# Patient Record
Sex: FEMALE | Race: WHITE | NOT HISPANIC OR LATINO | Employment: OTHER | URBAN - METROPOLITAN AREA
[De-identification: names, ages, dates, MRNs, and addresses within clinical notes are randomized per-mention and may not be internally consistent; named-entity substitution may affect disease eponyms.]

---

## 2021-07-28 PROBLEM — K21.9 GERD WITHOUT ESOPHAGITIS: Status: ACTIVE | Noted: 2021-07-28

## 2021-07-28 PROBLEM — F41.9 ANXIETY: Status: ACTIVE | Noted: 2019-06-07

## 2021-07-28 PROBLEM — Z80.0 FAMILY HX OF COLON CANCER: Status: ACTIVE | Noted: 2021-07-28

## 2021-08-09 ENCOUNTER — TELEPHONE (OUTPATIENT)
Dept: GASTROENTEROLOGY | Facility: HOSPITAL | Age: 61
End: 2021-08-09

## 2021-08-09 RX ORDER — SODIUM CHLORIDE 9 MG/ML
125 INJECTION, SOLUTION INTRAVENOUS CONTINUOUS
Status: CANCELLED | OUTPATIENT
Start: 2021-08-09

## 2021-08-10 ENCOUNTER — ANESTHESIA EVENT (OUTPATIENT)
Dept: GASTROENTEROLOGY | Facility: HOSPITAL | Age: 61
End: 2021-08-10

## 2021-08-10 ENCOUNTER — HOSPITAL ENCOUNTER (OUTPATIENT)
Dept: GASTROENTEROLOGY | Facility: HOSPITAL | Age: 61
Setting detail: OUTPATIENT SURGERY
Discharge: HOME | End: 2021-08-10
Attending: INTERNAL MEDICINE
Payer: COMMERCIAL

## 2021-08-10 ENCOUNTER — ANESTHESIA (OUTPATIENT)
Dept: GASTROENTEROLOGY | Facility: HOSPITAL | Age: 61
End: 2021-08-10

## 2021-08-10 VITALS
BODY MASS INDEX: 46.07 KG/M2 | OXYGEN SATURATION: 99 % | SYSTOLIC BLOOD PRESSURE: 114 MMHG | TEMPERATURE: 98.5 F | HEIGHT: 63 IN | DIASTOLIC BLOOD PRESSURE: 57 MMHG | RESPIRATION RATE: 21 BRPM | HEART RATE: 68 BPM | WEIGHT: 260 LBS

## 2021-08-10 DIAGNOSIS — K21.9 GERD WITHOUT ESOPHAGITIS: ICD-10-CM

## 2021-08-10 PROBLEM — G47.33 OSA ON CPAP: Status: ACTIVE | Noted: 2021-08-10

## 2021-08-10 PROBLEM — Z99.89 OSA ON CPAP: Status: ACTIVE | Noted: 2021-08-10

## 2021-08-10 PROBLEM — Z98.890 PONV (POSTOPERATIVE NAUSEA AND VOMITING): Status: ACTIVE | Noted: 2021-08-10

## 2021-08-10 PROBLEM — R11.2 PONV (POSTOPERATIVE NAUSEA AND VOMITING): Status: ACTIVE | Noted: 2021-08-10

## 2021-08-10 LAB — GLUCOSE SERPL-MCNC: 123 MG/DL (ref 65–140)

## 2021-08-10 PROCEDURE — 88305 TISSUE EXAM BY PATHOLOGIST: CPT | Performed by: PATHOLOGY

## 2021-08-10 PROCEDURE — 82948 REAGENT STRIP/BLOOD GLUCOSE: CPT

## 2021-08-10 PROCEDURE — 43239 EGD BIOPSY SINGLE/MULTIPLE: CPT | Performed by: INTERNAL MEDICINE

## 2021-08-10 RX ORDER — PROPOFOL 10 MG/ML
INJECTION, EMULSION INTRAVENOUS AS NEEDED
Status: DISCONTINUED | OUTPATIENT
Start: 2021-08-10 | End: 2021-08-10

## 2021-08-10 RX ORDER — OMEPRAZOLE 40 MG/1
40 CAPSULE, DELAYED RELEASE ORAL DAILY
Qty: 30 CAPSULE | Refills: 8 | Status: SHIPPED | OUTPATIENT
Start: 2021-08-10 | End: 2021-12-03

## 2021-08-10 RX ORDER — MULTIVIT WITH MINERALS/LUTEIN
500 TABLET ORAL 2 TIMES DAILY
COMMUNITY

## 2021-08-10 RX ORDER — SODIUM CHLORIDE 9 MG/ML
125 INJECTION, SOLUTION INTRAVENOUS CONTINUOUS
Status: DISCONTINUED | OUTPATIENT
Start: 2021-08-10 | End: 2021-08-14 | Stop reason: HOSPADM

## 2021-08-10 RX ORDER — LIDOCAINE HYDROCHLORIDE 20 MG/ML
INJECTION, SOLUTION EPIDURAL; INFILTRATION; INTRACAUDAL; PERINEURAL AS NEEDED
Status: DISCONTINUED | OUTPATIENT
Start: 2021-08-10 | End: 2021-08-10

## 2021-08-10 RX ADMIN — PROPOFOL 30 MG: 10 INJECTION, EMULSION INTRAVENOUS at 14:30

## 2021-08-10 RX ADMIN — SODIUM CHLORIDE 125 ML/HR: 0.9 INJECTION, SOLUTION INTRAVENOUS at 14:18

## 2021-08-10 RX ADMIN — PROPOFOL 150 MG: 10 INJECTION, EMULSION INTRAVENOUS at 14:27

## 2021-08-10 RX ADMIN — LIDOCAINE HYDROCHLORIDE 100 MG: 20 INJECTION, SOLUTION EPIDURAL; INFILTRATION; INTRACAUDAL; PERINEURAL at 14:28

## 2021-08-10 NOTE — ANESTHESIA PREPROCEDURE EVALUATION
Procedure:  EGD    Relevant Problems   ANESTHESIA   (+) PONV (postoperative nausea and vomiting)      CARDIO   (+) Hypertension, benign      GI/HEPATIC   (+) GERD without esophagitis      NEURO/PSYCH   (+) Anxiety      PULMONARY   (+) SIXTO on CPAP      Other   (+) Morbid obesity (HCC)        Physical Exam    Airway    Mallampati score: III  TM Distance: >3 FB  Neck ROM: full     Dental   No notable dental hx     Cardiovascular  Rhythm: regular, Rate: normal, Cardiovascular exam normal    Pulmonary  Pulmonary exam normal Breath sounds clear to auscultation,     Other Findings        Anesthesia Plan  ASA Score- 3     Anesthesia Type- general and IV sedation with anesthesia with ASA Monitors  Additional Monitors:   Airway Plan:     Comment: Nasal CPAP  Ondansetron IV pre-procedure  Plan Factors-    Chart reviewed  Patient summary reviewed  Patient is not a current smoker  Patient instructed to abstain from smoking on day of procedure  Patient did not smoke on day of surgery  Induction- intravenous  Postoperative Plan-     Informed Consent- Anesthetic plan and risks discussed with patient  I personally reviewed this patient with the CRNA  Discussed and agreed on the Anesthesia Plan with the CRNA  Rock Kaplan

## 2021-08-10 NOTE — DISCHARGE INSTRUCTIONS
Upper Endoscopy   WHAT YOU NEED TO KNOW:   An upper endoscopy is also called an upper gastrointestinal (GI) endoscopy, or an esophagogastroduodenoscopy (EGD)  You may feel bloated, gassy, or have some abdominal discomfort after your procedure  Your throat may be sore for 24 to 36 hours  You may burp or pass gas from air that is still inside your body  DISCHARGE INSTRUCTIONS:   Call 911 if:   · You have sudden chest pain or trouble breathing  Seek care immediately if:   · You feel dizzy or faint  · You have trouble swallowing  · You have severe throat pain  · Your bowel movements are very dark or black  · Your abdomen is hard and firm and you have severe pain  · You vomit blood  Contact your healthcare provider if:   · You feel full or bloated and cannot burp or pass gas  · You have not had a bowel movement for 3 days after your procedure  · You have neck pain  · You have a fever or chills  · You have nausea or are vomiting  · You have a rash or hives  · You have questions or concerns about your endoscopy  Relieve a sore throat:  Suck on throat lozenges or crushed ice  Gargle with a small amount of warm salt water  Mix 1 teaspoon of salt and 1 cup of warm water to make salt water  Relieve gas and discomfort from bloating:  Lie on your right side with a heating pad on your abdomen  Take short walks to help pass gas  Eat small meals until bloating is relieved  Rest after your procedure:  Do not drive or make important decisions until the day after your procedure  Return to your normal activity as directed  You can usually return to work the day after your procedure  Follow up with your healthcare provider as directed:  Write down your questions so you remember to ask them during your visits  © Copyright [employer] 2021 Information is for End User's use only and may not be sold, redistributed or otherwise used for commercial purposes   All illustrations and images included in CareNotes® are the copyrighted property of A D A M , Inc  or Torie Mendez   The above information is an  only  It is not intended as medical advice for individual conditions or treatments  Talk to your doctor, nurse or pharmacist before following any medical regimen to see if it is safe and effective for you

## 2021-10-20 ENCOUNTER — TELEPHONE (OUTPATIENT)
Dept: OBGYN CLINIC | Facility: OTHER | Age: 61
End: 2021-10-20

## 2021-11-17 ENCOUNTER — APPOINTMENT (OUTPATIENT)
Dept: RADIOLOGY | Facility: CLINIC | Age: 61
End: 2021-11-17
Payer: COMMERCIAL

## 2021-11-17 ENCOUNTER — OFFICE VISIT (OUTPATIENT)
Dept: OBGYN CLINIC | Facility: CLINIC | Age: 61
End: 2021-11-17
Payer: COMMERCIAL

## 2021-11-17 VITALS
BODY MASS INDEX: 48.16 KG/M2 | SYSTOLIC BLOOD PRESSURE: 135 MMHG | HEART RATE: 69 BPM | WEIGHT: 271.8 LBS | DIASTOLIC BLOOD PRESSURE: 85 MMHG | HEIGHT: 63 IN

## 2021-11-17 DIAGNOSIS — M48.061 FORAMINAL STENOSIS OF LUMBAR REGION: ICD-10-CM

## 2021-11-17 DIAGNOSIS — M54.40 CHRONIC MIDLINE LOW BACK PAIN WITH SCIATICA, SCIATICA LATERALITY UNSPECIFIED: ICD-10-CM

## 2021-11-17 DIAGNOSIS — M48.062 SPINAL STENOSIS OF LUMBAR REGION WITH NEUROGENIC CLAUDICATION: ICD-10-CM

## 2021-11-17 DIAGNOSIS — M51.37 DISC DISEASE, DEGENERATIVE, LUMBAR OR LUMBOSACRAL: ICD-10-CM

## 2021-11-17 DIAGNOSIS — M54.42 LOW BACK PAIN WITH BILATERAL SCIATICA, UNSPECIFIED BACK PAIN LATERALITY, UNSPECIFIED CHRONICITY: ICD-10-CM

## 2021-11-17 DIAGNOSIS — M43.10 DEGENERATIVE SPONDYLOLISTHESIS: ICD-10-CM

## 2021-11-17 DIAGNOSIS — M54.2 NECK PAIN: ICD-10-CM

## 2021-11-17 DIAGNOSIS — M50.30 DEGENERATIVE DISC DISEASE, CERVICAL: ICD-10-CM

## 2021-11-17 DIAGNOSIS — M51.36 DDD (DEGENERATIVE DISC DISEASE), LUMBAR: Primary | ICD-10-CM

## 2021-11-17 DIAGNOSIS — M54.41 LOW BACK PAIN WITH BILATERAL SCIATICA, UNSPECIFIED BACK PAIN LATERALITY, UNSPECIFIED CHRONICITY: ICD-10-CM

## 2021-11-17 DIAGNOSIS — G89.29 CHRONIC MIDLINE LOW BACK PAIN WITH SCIATICA, SCIATICA LATERALITY UNSPECIFIED: ICD-10-CM

## 2021-11-17 PROCEDURE — 72110 X-RAY EXAM L-2 SPINE 4/>VWS: CPT

## 2021-11-17 PROCEDURE — 99204 OFFICE O/P NEW MOD 45 MIN: CPT | Performed by: ORTHOPAEDIC SURGERY

## 2022-08-03 PROBLEM — M54.9 BACK PAIN: Status: ACTIVE | Noted: 2022-08-03

## 2022-08-03 PROBLEM — K59.01 SLOW TRANSIT CONSTIPATION: Status: ACTIVE | Noted: 2022-08-03

## 2023-06-06 ENCOUNTER — OFFICE VISIT (OUTPATIENT)
Dept: BARIATRICS | Facility: CLINIC | Age: 63
End: 2023-06-06
Payer: COMMERCIAL

## 2023-06-06 VITALS
SYSTOLIC BLOOD PRESSURE: 132 MMHG | HEIGHT: 63 IN | RESPIRATION RATE: 20 BRPM | BODY MASS INDEX: 46.92 KG/M2 | TEMPERATURE: 98.2 F | DIASTOLIC BLOOD PRESSURE: 80 MMHG | HEART RATE: 76 BPM | OXYGEN SATURATION: 94 % | WEIGHT: 264.8 LBS

## 2023-06-06 DIAGNOSIS — G47.33 OSA ON CPAP: ICD-10-CM

## 2023-06-06 DIAGNOSIS — F41.9 ANXIETY: ICD-10-CM

## 2023-06-06 DIAGNOSIS — E11.9 TYPE 2 DIABETES MELLITUS WITHOUT COMPLICATION (HCC): ICD-10-CM

## 2023-06-06 DIAGNOSIS — Z99.89 OSA ON CPAP: ICD-10-CM

## 2023-06-06 DIAGNOSIS — E11.69 DIABETES MELLITUS TYPE 2 IN OBESE (HCC): ICD-10-CM

## 2023-06-06 DIAGNOSIS — I10 HYPERTENSION, BENIGN: ICD-10-CM

## 2023-06-06 DIAGNOSIS — E66.9 DIABETES MELLITUS TYPE 2 IN OBESE (HCC): ICD-10-CM

## 2023-06-06 DIAGNOSIS — K21.9 GERD WITHOUT ESOPHAGITIS: ICD-10-CM

## 2023-06-06 DIAGNOSIS — E66.01 MORBID OBESITY (HCC): Primary | ICD-10-CM

## 2023-06-06 PROCEDURE — 99204 OFFICE O/P NEW MOD 45 MIN: CPT | Performed by: PHYSICIAN ASSISTANT

## 2023-06-06 RX ORDER — METFORMIN HYDROCHLORIDE 750 MG/1
TABLET, EXTENDED RELEASE ORAL
COMMUNITY
Start: 2023-06-02

## 2023-06-06 RX ORDER — HYDROCODONE BITARTRATE AND ACETAMINOPHEN 5; 325 MG/1; MG/1
TABLET ORAL
COMMUNITY
Start: 2023-05-04

## 2023-06-06 NOTE — ASSESSMENT & PLAN NOTE
Lab Results   Component Value Date    HGBA1C 6 3 (H) 04/04/2023   -Currently on Metformin 750mg daily  -Will plan to start Mounjaro 2 5mg to improve glycemia and help with weight loss  Denies personal hx of pancreatitis or family hx of MEN2 or MTC   SE profile reviewed

## 2023-06-06 NOTE — PROGRESS NOTES
Assessment/Plan: Morbid obesity ([facility] )  -Discussed options of HealthyCORE-Intensive Lifestyle Intervention Program, Very Low Calorie Diet-VLCD, Conservative Program, Dell-En-Y Gastric Bypass and Vertical Sleeve Gastrectomy and the role of weight loss medications  -not currently interested in surgery  Discussed metabolic benefits of bariatric surgery  -Initial weight loss goal of 5-10% weight loss for improved health  -Screening labs: reviewed CMP, Lipid panel, TSH, HgbA1c  -Patient is interested in pursuing conservative program  -Calorie goals, sample menu, portion size guidelines, and food logging reviewed with the patient  SIXTO on CPAP  -compliant with CPAP  -can improve with weight loss    Type 2 diabetes mellitus without complication (HCC)    Lab Results   Component Value Date    HGBA1C 6 3 (H) 04/04/2023   -Currently on Metformin 750mg daily  -Will plan to start Mounjaro 2 5mg to improve glycemia and help with weight loss  Denies personal hx of pancreatitis or family hx of MEN2 or MTC  SE profile reviewed    GERD without esophagitis  -On Omeprazole  -avoid food triggers and large portion sizes  -can improve with weight loss    Hypertension, benign  -On Bisoprolol-HCTZ + additional HCTZ  -can improve with weight loss    Anxiety  -On Cymbalta  -Also takes for Fibromyalagia    Goals:    Food log (ie ) www myfitnesspal com,sparkpeople  com,loseit com,calorieking  com,etc    No sugary beverages  At least 64oz of water daily  Increase physical activity by 10 minutes daily  Gradually increase physical activity to a goal of 5 days per week for 30 minutes of MODERATE intensity PLUS 2 days per week of FULL BODY resistance training  5810-1265 calories per day  70 grams of protein per day      Follow up in approximately 2 months with Non-Surgical Physician/Advanced Practitioner      Diagnoses and all orders for this visit:    Morbid obesity ([facility] )    SIXTO on CPAP    Diabetes mellitus type 2 in obese ([facility] )  - tirzepatide 2 5 MG/0 5ML; Inject 0 5 mL (2 5 mg total) under the skin every 7 days    GERD without esophagitis    Hypertension, benign    Anxiety    Type 2 diabetes mellitus without complication (HCC)    Other orders  -     HYDROcodone-acetaminophen (NORCO) 5-325 mg per tablet; TAKE 1-2 TABLETS BY MOUTH EVERY 12 (TWELVE) HOURS AS NEEDED FOR PAIN  MAX DAILY AMOUNT: 4 TABLETS  -     metFORMIN (GLUCOPHAGE-XR) 750 mg 24 hr tablet  -     DULoxetine HCl 60 MG CSDR          Subjective:   Chief Complaint   Patient presents with   • Consult       Patient ID: Ash Wilson  is a 58 y o  female with excess weight/obesity here to pursue weight managment  Past Medical History:   Diagnosis Date   • Anusitis     history of   • CAD (coronary artery disease) 2008   • Diverticulosis 2006   • Fatty liver    • Fibromyalgia    • Fibromyalgia, primary    • GERD (gastroesophageal reflux disease)    • H  pylori infection 12/1999    treated with Prilosec and Biaxin,  Recurrent H  pylori stool antigen positive 06/03 treated with Aciphex, amoxicillin and Biaxin unsuccessfully with recurrent positivity 12/03, treated Prilosec and Biaxin 01/04  Positive breath test , 05/04 with Aciphex therapy used for 2 weeks 08/04     • History of DVT (deep vein thrombosis)     following knee replacement   • History of panic attacks    • Hyperlipidemia    • Hypertension          HPI:  Obesity/Excess Weight:  Severity: Severe  Onset:  Since childhood , worsened after pregnancies  Modifiers: [employer] - lost 100 lbs, Physician supervised weight loss program, Weight [employer]chers, [place] Weight loss  Contributing factors: Poor Food Choices and Pregnancy  Associated symptoms: feels uncomfortable, poor self image, comorbid conditions, affects mobility    Goals: 140 lbs  Highest:  273 lbs    Hydration: 10-12 glasses water, hot tea unsweetened, DD coffee + cream + sugar, rare regular soda  ETOH: denies  Exercise: tries to walk some; no routinue  Occupation: "disabled  Sleep: 7 hours  Smoking: denies, former      Colonoscopy: completed 2020, 5 year recall    The following portions of the patient's history were reviewed and updated as appropriate: allergies, current medications, past family history, past medical history, past social history, past surgical history and problem list     Review of Systems   Constitutional: Negative for chills and fever  HENT: Negative for sore throat  Respiratory: Positive for cough (dry cough related to allergies)  Negative for shortness of breath  Cardiovascular: Positive for chest pain (intermittently, relieves if burping, reports negative cardiac work up)  Negative for palpitations  Gastrointestinal: Positive for constipation (chronic) and nausea  Negative for abdominal pain and vomiting  +GERD   Genitourinary: Negative for dysuria  Musculoskeletal: Positive for arthralgias  Skin: Negative for rash  Neurological: Negative for dizziness and headaches  Psychiatric/Behavioral: Positive for dysphoric mood (occasionally)  The patient is nervous/anxious  Feels discouraged       Objective:    /80 (BP Location: Left arm, Patient Position: Sitting, Cuff Size: Large)   Pulse 76   Temp 98 2 °F (36 8 °C)   Resp 20   Ht 5' 2 5\" (1 588 m)   Wt 120 kg (264 lb 12 8 oz)   SpO2 94%   BMI 47 66 kg/m²     Physical Exam  Vitals and nursing note reviewed  Constitutional   General appearance: Abnormal   well developed and morbidly obese  Eyes No conjunctival pallor  Ears, Nose, Mouth, and Throat Oral mucosa moist    Pulmonary   Respiratory effort: No increased work of breathing or signs of respiratory distress  Auscultation of lungs: Clear to auscultation, equal breath sounds bilaterally, no wheezes, no rales, no rhonci  Cardiovascular   Auscultation of heart: Normal rate and rhythm, normal S1 and S2, without murmurs      Examination of extremities for edema and/or varicosities: + edema bilateral LE " Abdomen   Abdomen: Abnormal   The abdomen was obese  Bowel sounds were normal  The abdomen was soft and nontender     Musculoskeletal   Gait and station: Normal     Psychiatric   Orientation to person, place and time: Normal     Affect: appropriate

## 2023-06-06 NOTE — ASSESSMENT & PLAN NOTE
-Discussed options of HealthyCORE-Intensive Lifestyle Intervention Program, Very Low Calorie Diet-VLCD, Conservative Program, Dell-En-Y Gastric Bypass and Vertical Sleeve Gastrectomy and the role of weight loss medications  -not currently interested in surgery  Discussed metabolic benefits of bariatric surgery  -Initial weight loss goal of 5-10% weight loss for improved health  -Screening labs: reviewed CMP, Lipid panel, TSH, HgbA1c  -Patient is interested in pursuing conservative program  -Calorie goals, sample menu, portion size guidelines, and food logging reviewed with the patient

## 2023-06-06 NOTE — PATIENT INSTRUCTIONS
Goals: Food log (ie ) www myfitnesspal com,sparkpeople  com,loseit com,calorieking  com,etc    No sugary beverages  At least 64oz of water daily  Increase physical activity by 10 minutes daily  Gradually increase physical activity to a goal of 5 days per week for 30 minutes of MODERATE intensity PLUS 2 days per week of FULL BODY resistance training  2089-3037 calories per day  70 grams of protein per day    Www  Mounjaro  com

## 2023-06-09 ENCOUNTER — TELEPHONE (OUTPATIENT)
Dept: BARIATRICS | Facility: CLINIC | Age: 63
End: 2023-06-09

## 2023-06-09 NOTE — TELEPHONE ENCOUNTER
Pt called regarding tirzepatide  It is not covered  Pt was told by the pharmacy that they sent something to our office  I gave our fax number to patient and asked her to verify that they have the correct number  Pt is on vacation next week and was hoping to have this resolved as soon as possible

## 2023-06-12 NOTE — TELEPHONE ENCOUNTER
Prior auth started and marked urgent    FREDIS LUCERO (Lea: [id number])  Mounjaro 2 5MG/0 5ML pen-injectors

## 2023-06-13 DIAGNOSIS — E66.9 DIABETES MELLITUS TYPE 2 IN OBESE (HCC): Primary | ICD-10-CM

## 2023-06-13 DIAGNOSIS — E11.69 DIABETES MELLITUS TYPE 2 IN OBESE (HCC): Primary | ICD-10-CM

## 2023-06-13 NOTE — TELEPHONE ENCOUNTER
Denied    Called and spoke to patient to let her know that provider will be sending over [address] instead    Aware provider will send mychart message with instructions

## 2023-08-22 ENCOUNTER — OFFICE VISIT (OUTPATIENT)
Dept: BARIATRICS | Facility: CLINIC | Age: 63
End: 2023-08-22
Payer: COMMERCIAL

## 2023-08-22 VITALS
HEART RATE: 68 BPM | DIASTOLIC BLOOD PRESSURE: 78 MMHG | WEIGHT: 258.6 LBS | TEMPERATURE: 98 F | RESPIRATION RATE: 20 BRPM | OXYGEN SATURATION: 94 % | BODY MASS INDEX: 45.82 KG/M2 | SYSTOLIC BLOOD PRESSURE: 128 MMHG | HEIGHT: 63 IN

## 2023-08-22 DIAGNOSIS — E66.9 DIABETES MELLITUS TYPE 2 IN OBESE (HCC): ICD-10-CM

## 2023-08-22 DIAGNOSIS — E11.69 DIABETES MELLITUS TYPE 2 IN OBESE (HCC): ICD-10-CM

## 2023-08-22 DIAGNOSIS — E66.01 MORBID OBESITY (HCC): Primary | ICD-10-CM

## 2023-08-22 DIAGNOSIS — E11.9 TYPE 2 DIABETES MELLITUS WITHOUT COMPLICATION (HCC): ICD-10-CM

## 2023-08-22 PROCEDURE — 99214 OFFICE O/P EST MOD 30 MIN: CPT | Performed by: PHYSICIAN ASSISTANT

## 2023-08-22 RX ORDER — TERBINAFINE HYDROCHLORIDE 250 MG/1
TABLET ORAL
COMMUNITY
Start: 2023-07-17

## 2023-08-22 RX ORDER — FUROSEMIDE 20 MG/1
TABLET ORAL
COMMUNITY
Start: 2023-08-18

## 2023-08-22 NOTE — ASSESSMENT & PLAN NOTE
-Patient is pursuing Conservative Program  -Initial weight loss goal of 5-10% weight loss for improved health  -not currently interested in bariatric surgery  -Screening labs: up to date  -dietary recall reviewed. Suggestions provided.  Encouraged patient to measure portions  -Patient open to meeting with [facility] for Telemed visit  -suggested meal planning with RD but she defers currently  -recommend to resume Ozempic with plans to transition to [facility]VICKY SANTOS    Initial: 264.8 lbs  Current: 258.6 lbs  Change: -6.2 lbs  Goal: 140 lbs

## 2023-08-22 NOTE — ASSESSMENT & PLAN NOTE
Lab Results   Component Value Date    HGBA1C 6.3 (H) 04/04/2023   -Currently on Metformin 750mg daily  -Mounjaro denied as she must try and fail 2 formulary alternatives. Was on Ozempic but her pen is malfunctioning and was only able to take 2 doses of the 0.5mg dose. Has been off the med for 3 weeks. Has not been able to get in touch with . Informed patient I would reach out to rep.  In the mean time a  Refill was sent to pharmacy and she will start again at 0.25mg dose

## 2023-08-22 NOTE — PROGRESS NOTES
Assessment/Plan: Morbid obesity ([address])  -Patient is pursuing Conservative Program  -Initial weight loss goal of 5-10% weight loss for improved health  -not currently interested in bariatric surgery  -Screening labs: up to date  -dietary recall reviewed. Suggestions provided. Encouraged patient to measure portions  -Patient open to meeting with [address] [place] [address] for Telemed visit  -suggested meal planning with RD but she defers currently  -recommend to resume Ozempic with plans to transition to [place]    Initial: 264.8 lbs  Current: 258.6 lbs  Change: -6.2 lbs  Goal: 140 lbs    Type 2 diabetes mellitus without complication ([address] [facility])    Lab Results   Component Value Date    HGBA1C 6.3 (H) 04/04/2023   -Currently on Metformin 750mg daily  -Mounjaro denied as she must try and fail 2 formulary alternatives. Was on Ozempic but her pen is malfunctioning and was only able to take 2 doses of the 0.5mg dose. Has been off the med for 3 weeks. Has not been able to get in touch with . Informed patient I would reach out to rep. In the mean time a  Refill was sent to pharmacy and she will start again at 0.25mg dose    Goals:    Food log (ie.) www.[employer].com,[employer]. [employer].com,[employer]. com,etc.   No sugary beverages. At least 64oz of water daily. Increase physical activity by 10 minutes daily. Gradually increase physical activity to a goal of 5 days per week for 30 minutes of MODERATE intensity PLUS 2 days per week of FULL BODY resistance training  3703-2508 calories per day  70 grams of protein per day  5-10 servings of fruits and vegetables per day   4oz lean protein, 1/2 cup starch, 1-2 cups of nonstarchy veggie      Follow up in approximately 2 months with Non-Surgical Physician/Advanced Practitioner. Diagnoses and all orders for this visit:    Morbid obesity ([address])  -     semaglutide, 0.25 or 0.5 mg/dose, (Ozempic, 0.25 or 0.5 MG/DOSE,) 2 mg/3 mL injection pen;  Inject 0.25mg subcutaneously (abdomen, thigh) once weekly for 4 weeks and then increase to 0.5mg once weekly    Type 2 diabetes mellitus without complication (HCC)    Diabetes mellitus type 2 in obese (HCC)  -     semaglutide, 0.25 or 0.5 mg/dose, (Ozempic, 0.25 or 0.5 MG/DOSE,) 2 mg/3 mL injection pen; Inject 0.25mg subcutaneously (abdomen, thigh) once weekly for 4 weeks and then increase to 0.5mg once weekly    Other orders  -     terbinafine (LamISIL) 250 mg tablet; take 1 tablet by mouth once daily for 10 days  -     furosemide (LASIX) 20 mg tablet; take 1 tablet by mouth every morning for 6 days then CALL OFFICE WITH UPDATE (HOLD ONTO EXTRA TABS)          Subjective:   Chief Complaint   Patient presents with   • Follow-up        Patient ID: Debbi Salinas  is a 58 y.o. female with excess weight/obesity here to pursue weight managment. Patient is pursuing Conservative Program.     HPI Patient presents for MWM follow up. Patient was taking Ozempic 0.25mg and increased to 0.5mg and then her pen malfunctioned. Has been off of medication for past 3 weeks. Was able to take 2 doses of the 0.25mg dose. -Reports her fasting blood sugar has been elevated since off the Ozempic. Continues on Metformin    Food logging: not reguarly, hard to keep up with it. Not measuring portions  Exercise: reports feels she has been more active but no routine.  Was in PT for her legs, difficult for her to exercise  Hydration: meeting water goals, cut out DD coffee  Food cravings: struggles with sweet and salty cravings    B: banana oatmeal breakfast bar w/ [place] yogurt + blueberries OR Smoothie with frozen blueberries + collagen protein powder and almond milk + heaping spoonful or greek yogurt + nut butter  S: greek yogurt OR Nuts mixed with dark chocolate OR veggie straws or chips  L: grilled chicken on white bun with tomato + mozarella cheese w/ clark and honey mustard OR chicken salad  S:  Potato chips + onion dip or chex mix  D: Taco with white tortilla w./ ground beef + sour cream and salsa and veggie  S: veggie straws or small ice cream cone    -reports gluten sensitive    Wt Readings from Last 10 Encounters:   08/22/23 117 kg (258 lb 9.6 oz)   06/06/23 120 kg (264 lb 12.8 oz)   01/09/23 112 kg (247 lb)   08/03/22 118 kg (260 lb 9.6 oz)   11/17/21 123 kg (271 lb 12.8 oz)   08/10/21 118 kg (260 lb)   07/28/21 122 kg (268 lb 9.6 oz)        The following portions of the patient's history were reviewed and updated as appropriate: allergies, current medications, past family history, past medical history, past social history, past surgical history and problem list.    Review of Systems   Cardiovascular: Negative. Gastrointestinal: Negative for constipation and diarrhea. Objective:    /78 (BP Location: Left arm, Patient Position: Sitting, Cuff Size: Large)   Pulse 68   Temp 98 °F (36.7 °C)   Resp 20   Ht 5' 2.5" (1.588 m)   Wt 117 kg (258 lb 9.6 oz)   SpO2 94%   BMI 46.54 kg/m²      Physical Exam  Vitals and nursing note reviewed. Constitutional:       General: She is not in acute distress. Appearance: She is obese. She is not toxic-appearing. HENT:      Head: Normocephalic and atraumatic. Mouth/Throat:      Mouth: Mucous membranes are moist.   Eyes:      General: No scleral icterus. Pulmonary:      Effort: Pulmonary effort is normal. No respiratory distress. Abdominal:      Comments: Obese, protuberant   Musculoskeletal:      Right lower leg: Edema present. Left lower leg: Edema present. Neurological:      General: No focal deficit present. Mental Status: She is alert and oriented to person, place, and time. Psychiatric:         Mood and Affect: Mood normal.         Behavior: Behavior normal.         Thought Content:  Thought content normal.

## 2023-08-22 NOTE — PATIENT INSTRUCTIONS
Goals: Food log (ie.) www.[employer].com,[employer]. [employer].com,[employer]. com,etc.   No sugary beverages. At least 64oz of water daily. Increase physical activity by 10 minutes daily.  Gradually increase physical activity to a goal of 5 days per week for 30 minutes of MODERATE intensity PLUS 2 days per week of FULL BODY resistance training  2448-1630 calories per day  70 grams of protein per day  5-10 servings of fruits and vegetables per day   4oz lean protein, 1/2 cup starch, 1-2 cups of nonstarchy veggie

## 2023-08-28 ENCOUNTER — OFFICE VISIT (OUTPATIENT)
Dept: BARIATRICS | Facility: CLINIC | Age: 63
End: 2023-08-28

## 2023-08-28 DIAGNOSIS — E66.01 MORBID OBESITY (HCC): Primary | ICD-10-CM

## 2023-08-28 PROCEDURE — RECHECK

## 2023-08-28 NOTE — PROGRESS NOTES
Patient's name and  were verified during visit. Provider explained that [employer] is a HIPPA compliant platform and to further protect their confidentiality the provider was alone and the office door was closed. Patient consented to the virtual video visit. This visit is free. Patient presents for 1 hour Behavioral Health Evaluation as a part of Medical Weight Management Program.    Eating behaviors/food choices: patient struggles with cravings, distorted relationship with food and poor self image from when she was younger. She had received negative messages about her body as a child, taught not to waste food and was given very highly palatable food with confusing messaging about what she should eat. Discussed her routine, she is trying to balance taking care of her dietary needs with helping others. She may forget to bring healthy meals with her when going to her daughter's house when she helps with her grandchildren or if her daughter makes dinner for them it may be higher calorie items. Encouraged patient to work on planning out her meals with her family, possibly bringing her own groceries to her daughter's house since she's splitting her time between both homes so she can care for herself. She is trying to get better with tracking but sometimes forgets and is tracking after she's already eaten calories. Suggested she consider pre-tracking her meals so she can see her remaining calorie budget for the day. Suggested she work on getting in lean proteins and healthy snacks to sustain her between meals when needed. 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 She helps her daughter take care of her new born and 3year old, her daughter struggles with her own anxiety and depression.  Patient feels she needs to be there to help her along with wanting to help her daughter but it is at the sacrifice of her own health at times. Suggested bringing in more support for her daughter in the form of therapy if her daughter is struggling as she is so it doesn't fall to her to take care of everything. Encouraged her to look ahead at her week on a Sunday and to plan out meals, shopping for her home and her daughter's if needed to make sure she has the healthy items for her meals. Discussed the pillars of self care, good nutrition, activity, rest and being aware of mindset and to make sure she's tuning into whether she's attending to these areas regularly. Goals:    - tune into triggers of cravings and mindset regarding food, check on stress level, fatigue or feelings that could be prompting desire to eat  - reframe mindset about food, there is room for all food, figure out where higher calorie foods can fit into your caloric budget and if there are certain foods that are hard to resist having larger portions. - consider planning and pre-tracking foods to figure out calories left for other meals and snacks  - incorporate activity throughout the day, be aware of appetite increase due to being more active  - talk with supports about how they can help with your weight management journey, find balance in caring for self as well as others.     Next Appointment:  With PA on 11/7

## 2023-08-28 NOTE — PATIENT INSTRUCTIONS
- tune into triggers of cravings and mindset regarding food, check on stress level, fatigue or feelings that could be prompting desire to eat  - reframe mindset about food, there is room for all food, figure out where higher calorie foods can fit into your caloric budget and if there are certain foods that are hard to resist having larger portions. - consider planning and pre-tracking foods to figure out calories left for other meals and snacks  - incorporate activity throughout the day, be aware of appetite increase due to being more active  - talk with supports about how they can help with your weight management journey, find balance in caring for self as well as others.

## 2023-09-21 DIAGNOSIS — E66.01 MORBID OBESITY (HCC): ICD-10-CM

## 2023-09-21 DIAGNOSIS — E11.69 DIABETES MELLITUS TYPE 2 IN OBESE: ICD-10-CM

## 2023-09-21 DIAGNOSIS — E66.9 DIABETES MELLITUS TYPE 2 IN OBESE: ICD-10-CM

## 2023-10-02 ENCOUNTER — APPOINTMENT (EMERGENCY)
Dept: CT IMAGING | Facility: HOSPITAL | Age: 63
End: 2023-10-02
Payer: COMMERCIAL

## 2023-10-02 ENCOUNTER — HOSPITAL ENCOUNTER (EMERGENCY)
Facility: HOSPITAL | Age: 63
Discharge: HOME | End: 2023-10-02
Attending: EMERGENCY MEDICINE
Payer: COMMERCIAL

## 2023-10-02 VITALS
TEMPERATURE: 97.8 F | HEART RATE: 85 BPM | DIASTOLIC BLOOD PRESSURE: 78 MMHG | SYSTOLIC BLOOD PRESSURE: 171 MMHG | OXYGEN SATURATION: 96 % | RESPIRATION RATE: 18 BRPM

## 2023-10-02 DIAGNOSIS — R10.84 GENERALIZED ABDOMINAL PAIN: Primary | ICD-10-CM

## 2023-10-02 DIAGNOSIS — R19.7 DIARRHEA, UNSPECIFIED TYPE: ICD-10-CM

## 2023-10-02 LAB
ALBUMIN SERPL BCP-MCNC: 4.2 G/DL (ref 3.5–5)
ALP SERPL-CCNC: 58 U/L (ref 34–104)
ALT SERPL W P-5'-P-CCNC: 31 U/L (ref 7–52)
ANION GAP SERPL CALCULATED.3IONS-SCNC: 10 MMOL/L
AST SERPL W P-5'-P-CCNC: 15 U/L (ref 13–39)
ATRIAL RATE: 89 BPM
BACTERIA UR QL AUTO: ABNORMAL /HPF
BASOPHILS # BLD AUTO: 0.06 THOUSANDS/ÂΜL (ref 0–0.1)
BASOPHILS NFR BLD AUTO: 1 % (ref 0–1)
BILIRUB SERPL-MCNC: 0.46 MG/DL (ref 0.2–1)
BILIRUB UR QL STRIP: NEGATIVE
BUN SERPL-MCNC: 11 MG/DL (ref 5–25)
CALCIUM SERPL-MCNC: 9.8 MG/DL (ref 8.4–10.2)
CARDIAC TROPONIN I PNL SERPL HS: 4 NG/L (ref 8–18)
CHLORIDE SERPL-SCNC: 100 MMOL/L (ref 96–108)
CLARITY UR: CLEAR
CO2 SERPL-SCNC: 30 MMOL/L (ref 21–32)
COLOR UR: YELLOW
CREAT SERPL-MCNC: 0.69 MG/DL (ref 0.6–1.3)
EOSINOPHIL # BLD AUTO: 0.04 THOUSAND/ÂΜL (ref 0–0.61)
EOSINOPHIL NFR BLD AUTO: 0 % (ref 0–6)
ERYTHROCYTE [DISTWIDTH] IN BLOOD BY AUTOMATED COUNT: 14.1 % (ref 11.6–15.1)
GFR SERPL CREATININE-BSD FRML MDRD: 93 ML/MIN/1.73SQ M
GLUCOSE SERPL-MCNC: 141 MG/DL (ref 65–140)
GLUCOSE SERPL-MCNC: 151 MG/DL (ref 65–140)
GLUCOSE UR STRIP-MCNC: NEGATIVE MG/DL
HCT VFR BLD AUTO: 43.4 % (ref 34.8–46.1)
HGB BLD-MCNC: 14 G/DL (ref 11.5–15.4)
HGB UR QL STRIP.AUTO: NEGATIVE
HOLD SPECIMEN: NORMAL
IMM GRANULOCYTES # BLD AUTO: 0.1 THOUSAND/UL (ref 0–0.2)
IMM GRANULOCYTES NFR BLD AUTO: 1 % (ref 0–2)
KETONES UR STRIP-MCNC: NEGATIVE MG/DL
LEUKOCYTE ESTERASE UR QL STRIP: ABNORMAL
LIPASE SERPL-CCNC: 29 U/L (ref 11–82)
LYMPHOCYTES # BLD AUTO: 4.08 THOUSANDS/ÂΜL (ref 0.6–4.47)
LYMPHOCYTES NFR BLD AUTO: 31 % (ref 14–44)
MCH RBC QN AUTO: 29.3 PG (ref 26.8–34.3)
MCHC RBC AUTO-ENTMCNC: 32.3 G/DL (ref 31.4–37.4)
MCV RBC AUTO: 91 FL (ref 82–98)
MONOCYTES # BLD AUTO: 0.84 THOUSAND/ÂΜL (ref 0.17–1.22)
MONOCYTES NFR BLD AUTO: 6 % (ref 4–12)
NEUTROPHILS # BLD AUTO: 7.94 THOUSANDS/ÂΜL (ref 1.85–7.62)
NEUTS SEG NFR BLD AUTO: 61 % (ref 43–75)
NITRITE UR QL STRIP: NEGATIVE
NON-SQ EPI CELLS URNS QL MICRO: ABNORMAL /HPF
NRBC BLD AUTO-RTO: 0 /100 WBCS
P AXIS: 43 DEGREES
PH UR STRIP.AUTO: 7 [PH]
PLATELET # BLD AUTO: 377 THOUSANDS/UL (ref 149–390)
PMV BLD AUTO: 9.2 FL (ref 8.9–12.7)
POTASSIUM SERPL-SCNC: 3 MMOL/L (ref 3.5–5.3)
PR INTERVAL: 154 MS
PROT SERPL-MCNC: 6.7 G/DL (ref 6.4–8.4)
PROT UR STRIP-MCNC: NEGATIVE MG/DL
QRS AXIS: -13 DEGREES
QRSD INTERVAL: 92 MS
QT INTERVAL: 596 MS
QTC INTERVAL: 725 MS
RBC # BLD AUTO: 4.78 MILLION/UL (ref 3.81–5.12)
RBC #/AREA URNS AUTO: ABNORMAL /HPF
SODIUM SERPL-SCNC: 140 MMOL/L (ref 135–147)
SP GR UR STRIP.AUTO: 1.02 (ref 1–1.03)
T WAVE AXIS: 56 DEGREES
UROBILINOGEN UR STRIP-ACNC: <2 MG/DL
VENTRICULAR RATE: 89 BPM
WBC # BLD AUTO: 13.06 THOUSAND/UL (ref 4.31–10.16)
WBC #/AREA URNS AUTO: ABNORMAL /HPF

## 2023-10-02 PROCEDURE — 82948 REAGENT STRIP/BLOOD GLUCOSE: CPT

## 2023-10-02 PROCEDURE — 99285 EMERGENCY DEPT VISIT HI MDM: CPT | Performed by: EMERGENCY MEDICINE

## 2023-10-02 PROCEDURE — 84484 ASSAY OF TROPONIN QUANT: CPT | Performed by: EMERGENCY MEDICINE

## 2023-10-02 PROCEDURE — 81001 URINALYSIS AUTO W/SCOPE: CPT

## 2023-10-02 PROCEDURE — 83690 ASSAY OF LIPASE: CPT | Performed by: EMERGENCY MEDICINE

## 2023-10-02 PROCEDURE — 96374 THER/PROPH/DIAG INJ IV PUSH: CPT

## 2023-10-02 PROCEDURE — 74176 CT ABD & PELVIS W/O CONTRAST: CPT

## 2023-10-02 PROCEDURE — 99284 EMERGENCY DEPT VISIT MOD MDM: CPT

## 2023-10-02 PROCEDURE — 36415 COLL VENOUS BLD VENIPUNCTURE: CPT

## 2023-10-02 PROCEDURE — 85025 COMPLETE CBC W/AUTO DIFF WBC: CPT | Performed by: EMERGENCY MEDICINE

## 2023-10-02 PROCEDURE — 93005 ELECTROCARDIOGRAM TRACING: CPT

## 2023-10-02 PROCEDURE — G1004 CDSM NDSC: HCPCS

## 2023-10-02 PROCEDURE — 93010 ELECTROCARDIOGRAM REPORT: CPT | Performed by: INTERNAL MEDICINE

## 2023-10-02 PROCEDURE — 80053 COMPREHEN METABOLIC PANEL: CPT | Performed by: EMERGENCY MEDICINE

## 2023-10-02 RX ORDER — HYDROMORPHONE HCL/PF 1 MG/ML
0.5 SYRINGE (ML) INJECTION ONCE
Status: COMPLETED | OUTPATIENT
Start: 2023-10-02 | End: 2023-10-02

## 2023-10-02 RX ORDER — POTASSIUM CHLORIDE 20 MEQ/1
40 TABLET, EXTENDED RELEASE ORAL ONCE
Status: COMPLETED | OUTPATIENT
Start: 2023-10-02 | End: 2023-10-02

## 2023-10-02 RX ORDER — DICYCLOMINE HCL 20 MG
20 TABLET ORAL 2 TIMES DAILY
Qty: 10 TABLET | Refills: 0 | Status: SHIPPED | OUTPATIENT
Start: 2023-10-02

## 2023-10-02 RX ADMIN — POTASSIUM CHLORIDE 40 MEQ: 1500 TABLET, EXTENDED RELEASE ORAL at 22:25

## 2023-10-02 RX ADMIN — HYDROMORPHONE HYDROCHLORIDE 0.5 MG: 1 INJECTION, SOLUTION INTRAMUSCULAR; INTRAVENOUS; SUBCUTANEOUS at 20:18

## 2023-10-02 NOTE — ED PROVIDER NOTES
History  Chief Complaint   Patient presents with   • Diarrhea     Reports diarrhea 13 days, has appt with Dr. Angela Fournier Thursday to schedule endoscopy/colonoscopy. Reports sharp abdominal pain since last night. Now reports upper abdominal pain around bilateral bra line, reports pelvic floor pressure also. +diaphoresis +clammy +fatigue   • Abdominal Pain     Ninoska Joseph is a 58 y.o. female who has a past medical history of CAD, Diverticulosis, Fatty liver, Fibromyalgia, GERD (gastroesophageal reflux disease), History of DVT, Hyperlipidemia, Hypertension, History of cholecystectomy and hysterectomy presents in ED visit for diarrhea and abdominal pain. Patient reported she was having persistent constipation and was self-given warm water enema on 9/20. After then she has been diarrhea with nausea in the past 13 days. Denied vomiting. Patient described her bowel movements as watery, nonbloody, no mucus. Initially 4 times daily and slowly improving to 2 times daily then no diarrhea yesterday. However, did report 1 more episode of watery diarrhea this morning along with sudden onset RUQ sharp intermittent abdominal pain 6/10 since last night. Also reported chronic LUQ pain around breat level. Patient also endorsed subjective fever but no chills, sweating, chronic headache and dizziness. Positive sick contact with diarrhea 2 to 3 weeks ago. Denied recent antibiotics use. Patient also denied recent URI symptoms, SOB, chest pain, UTI symptoms. Prior to Admission Medications   Prescriptions Last Dose Informant Patient Reported? Taking?    5-Hydroxytryptophan (5-HTP) 100 MG CAPS   Yes No   Sig: Take 100 mg by mouth see administration instructions 1-3   B Complex Vitamins (VITAMIN B COMPLEX PO)   Yes No   Sig: Take 1 capsule by mouth 2 (two) times a day   Cholecalciferol (D3-1000 PO)   Yes No   Sig: Take 5,000 Int'l Units/day by mouth in the morning   DULoxetine (CYMBALTA) 30 mg delayed release capsule   Yes No Si mg    DULoxetine (CYMBALTA) 60 mg delayed release capsule   Yes No   DULoxetine HCl 60 MG CSDR   Yes No   FIBER PO   Yes No   Sig: Take by mouth   HYDROcodone-acetaminophen (NORCO) 5-325 mg per tablet   Yes No   Sig: TAKE 1-2 TABLETS BY MOUTH EVERY 12 (TWELVE) HOURS AS NEEDED FOR PAIN. MAX DAILY AMOUNT: 4 TABLETS.    NON FORMULARY   Yes No   Sig: Actiflex   NON FORMULARY   Yes No   Si   NON FORMULARY   Yes No   Sig: Adrenogen   NON FORMULARY   Yes No   Sig: Cytoyme PT/HPT   Potassium 99 MG TABS   Yes No   Sig: Take by mouth   Probiotic Product (PROBIOTIC PO)   Yes No   Sig: Take by mouth   Probiotic Product (ULTRAFLORA IMMUNE HEALTH PO)   Yes No   Sig: Take by mouth   Zinc Acetate, Oral, (ZINC ACETATE PO)   Yes No   Sig: Take by mouth   ascorbic acid (VITAMIN C) 250 mg tablet   Yes No   Sig: Take 500 mg by mouth 2 (two) times a day   aspirin (ECOTRIN LOW STRENGTH) 81 mg EC tablet   Yes No   Sig: Take by mouth   bisoprolol-hydrochlorothiazide (ZIAC) 10-6.25 MG per tablet   Yes No   Sig: Take 1 tablet by mouth every morning   cyclobenzaprine (FLEXERIL) 10 mg tablet   Yes No   Sig: take 1 tablet by mouth nightly if needed muscle spasm   Patient not taking: Reported on 8/3/2022   furosemide (LASIX) 20 mg tablet   Yes No   Sig: take 1 tablet by mouth every morning for 6 days then CALL OFFICE WITH UPDATE (HOLD ONTO EXTRA TABS)   metFORMIN (GLUCOPHAGE-XR) 750 mg 24 hr tablet   Yes No   omeprazole (PriLOSEC) 20 mg delayed release capsule   No No   Sig: Take 1 capsule (20 mg total) by mouth daily   semaglutide, 0.25 or 0.5 mg/dose, (Ozempic, 0.25 or 0.5 MG/DOSE,) 2 mg/3 mL injection pen   No No   Sig: Inject 0.5mg subcutaneously (abdomen, thigh) once weekly   terbinafine (LamISIL) 250 mg tablet   Yes No   Sig: take 1 tablet by mouth once daily for 10 days   traMADol (ULTRAM) 50 mg tablet   Yes No      Facility-Administered Medications: None       Past Medical History:   Diagnosis Date   • Anusitis     history of   • CAD (coronary artery disease) 2008   • Diverticulosis 2006   • Fatty liver    • Fibromyalgia    • Fibromyalgia, primary    • GERD (gastroesophageal reflux disease)    • H. pylori infection 12/1999    treated with Prilosec and Biaxin,  Recurrent H. pylori stool antigen positive 06/03 treated with Aciphex, amoxicillin and Biaxin unsuccessfully with recurrent positivity 12/03, treated Prilosec and Biaxin 01/04. Positive breath test , 05/04 with Aciphex therapy used for 2 weeks 08/04. • History of DVT (deep vein thrombosis)     following knee replacement   • History of panic attacks    • Hyperlipidemia    • Hypertension        Past Surgical History:   Procedure Laterality Date   • CARDIAC SURGERY      cardiac cath   • CARPAL TUNNEL RELEASE Right 03/2013   • CHOLECYSTECTOMY  1984   • COLONOSCOPY  2006    Diverticulosis   • COLONOSCOPY  2011    2 hyperplastic polyps removed   • COMBINED REDUCTION MAMMAPLASTY W/ ABDOMINOPLASTY  2005   • FOOT SURGERY     • HYSTERECTOMY      for prolapsed uterus with retention of ovaries   • JOINT REPLACEMENT Left     TKR and revision   • REPLACEMENT TOTAL KNEE      followed by DVT;  ANTIBIOTICS TO BE USED INDEFINITELY BEFORE DENTAL PROCEDURES. • US GUIDED THYROID BIOPSY  11/15/2017   • WRIST SURGERY Right     x5       Family History   Problem Relation Age of Onset   • Obesity Mother    • Colon cancer Mother    • Obesity Father    • Heart disease Father    • Diabetes Father    • Obesity Sister    • Diverticulitis Sister    • Obesity Sister    • Diverticulitis Sister    • Obesity Brother    • Colon polyps Brother    • Colon cancer Maternal Uncle    • Colon polyps Maternal Grandmother    • Stroke Maternal Grandfather    • Obesity Paternal Grandmother    • Stroke Paternal Grandmother    • Obesity Paternal Grandfather    • Stroke Paternal Grandfather      I have reviewed and agree with the history as documented.     E-Cigarette/Vaping   • E-Cigarette Use Never User E-Cigarette/Vaping Substances   • Nicotine No    • THC No    • CBD No    • Flavoring No    • Other No    • Unknown No      Social History     Tobacco Use   • Smoking status: Former     Types: Cigarettes     Quit date:      Years since quittin.7   • Smokeless tobacco: Never   • Tobacco comments:     Quit at age 22   Vaping Use   • Vaping Use: Never used   Substance Use Topics   • Alcohol use: Never   • Drug use: Never        Review of Systems   Constitutional: Positive for appetite change, diaphoresis, fatigue and fever. Negative for chills. HENT: Negative for ear pain, rhinorrhea, sinus pressure, sinus pain, sneezing, sore throat, tinnitus, trouble swallowing and voice change. Eyes: Negative for pain and visual disturbance. Respiratory: Negative for cough, choking, chest tightness, shortness of breath and wheezing. Cardiovascular: Positive for leg swelling. Negative for chest pain and palpitations. Gastrointestinal: Positive for abdominal pain, constipation, diarrhea and nausea. Negative for anal bleeding, blood in stool and vomiting. Endocrine: Positive for heat intolerance. Negative for cold intolerance. Genitourinary: Negative for dysuria, flank pain, hematuria and urgency. Musculoskeletal: Positive for back pain. Negative for arthralgias, neck pain and neck stiffness. Skin: Negative for color change and rash. Neurological: Positive for dizziness, weakness (Bilateral lower extremities) and headaches. Negative for seizures, syncope, facial asymmetry and speech difficulty. Psychiatric/Behavioral: Negative for agitation, behavioral problems, confusion, decreased concentration, dysphoric mood and hallucinations. The patient is nervous/anxious. The patient is not hyperactive. All other systems reviewed and are negative.       Physical Exam  ED Triage Vitals   Temperature Pulse Respirations Blood Pressure SpO2   10/02/23 1743 10/02/23 1743 10/02/23 1743 10/02/23 1743 10/02/23 1743 97.8 °F (36.6 °C) 91 18 158/75 97 %      Temp Source Heart Rate Source Patient Position - Orthostatic VS BP Location FiO2 (%)   10/02/23 1743 10/02/23 1743 10/02/23 1743 10/02/23 1743 --   Oral Monitor Sitting Right arm       Pain Score       10/02/23 2018       3             Orthostatic Vital Signs  Vitals:    10/02/23 1743 10/02/23 1957 10/02/23 2230   BP: 158/75 163/80 (!) 171/78   Pulse: 91 77 85   Patient Position - Orthostatic VS: Sitting Lying        Physical Exam  Vitals and nursing note reviewed. Constitutional:       General: She is not in acute distress. Appearance: She is well-developed. She is obese. She is not toxic-appearing. HENT:      Head: Normocephalic and atraumatic. Mouth/Throat:      Mouth: Mucous membranes are moist.   Eyes:      General: No scleral icterus. Extraocular Movements: Extraocular movements intact. Conjunctiva/sclera: Conjunctivae normal.      Pupils: Pupils are equal, round, and reactive to light. Cardiovascular:      Rate and Rhythm: Normal rate and regular rhythm. Heart sounds: No murmur heard. Pulmonary:      Effort: Pulmonary effort is normal. No respiratory distress. Breath sounds: Normal breath sounds. No wheezing, rhonchi or rales. Chest:      Chest wall: No tenderness. Abdominal:      General: A surgical scar is present. Bowel sounds are decreased. Palpations: Abdomen is soft. Tenderness: There is generalized abdominal tenderness and tenderness in the epigastric area, periumbilical area and suprapubic area. There is rebound (LLQ). There is no right CVA tenderness, left CVA tenderness or guarding. Positive signs include Rovsing's sign. Negative signs include Woodward's sign and McBurney's sign. Genitourinary:     Uterus: Absent. Musculoskeletal:         General: No swelling. Cervical back: Neck supple. Skin:     General: Skin is warm and dry. Capillary Refill: Capillary refill takes less than 2 seconds. Neurological:      General: No focal deficit present. Mental Status: She is alert and oriented to person, place, and time. Psychiatric:         Mood and Affect: Mood is anxious. Behavior: Behavior normal.         ED Medications  Medications   HYDROmorphone (DILAUDID) injection 0.5 mg (0.5 mg Intravenous Given 10/2/23 2018)   potassium chloride (K-DUR,KLOR-CON) CR tablet 40 mEq (40 mEq Oral Given 10/2/23 2225)       Diagnostic Studies  Results Reviewed     Procedure Component Value Units Date/Time    High Sensitivity Troponin I Random [[id number]]  (Abnormal) Collected: 10/02/23 2049    Lab Status: Final result Specimen: Blood from Arm, Left Updated: 10/02/23 2124     HS TnI random 4 ng/L     Urine Microscopic [[id number]]  (Abnormal) Collected: 10/02/23 1957    Lab Status: Final result Specimen: Urine, Clean Catch Updated: 10/02/23 2039     RBC, UA 1-2 /hpf      WBC, UA 4-10 /hpf      Epithelial Cells Occasional /hpf      Bacteria, UA Occasional /hpf     UA w Reflex to Microscopic w Reflex to Culture [[id number]]  (Abnormal) Collected: 10/02/23 1957    Lab Status: Final result Specimen: Urine, Clean Catch Updated: 10/02/23 2017     Color, UA Yellow     Clarity, UA Clear     Specific Gravity, UA 1.016     pH, UA 7.0     Leukocytes, UA Moderate     Nitrite, UA Negative     Protein, UA Negative mg/dl      Glucose, UA Negative mg/dl      Ketones, UA Negative mg/dl      Urobilinogen, UA <2.0 mg/dl      Bilirubin, UA Negative     Occult Blood, UA Negative    [place] draw [[id number]] Collected: 10/02/23 1752    Lab Status: Final result Specimen: Blood from Arm, Right Updated: 10/02/23 2001    Narrative: The following orders were created for panel order [place] draw.   Procedure                               Abnormality         Status                     ---------                               -----------         ------                     Sherryabdirashid Arm Top on LOKV[[id number]]                           Final result               Gold top on TEQK[[id number]]                                 Final result               Green / Black tube on VCXT[[id number]]                       Final result                 Please view results for these tests on the individual orders.     Comprehensive metabolic panel [[id number]]  (Abnormal) Collected: 10/02/23 1752    Lab Status: Final result Specimen: Blood from Arm, Right Updated: 10/02/23 1848     Sodium 140 mmol/L      Potassium 3.0 mmol/L      Chloride 100 mmol/L      CO2 30 mmol/L      ANION GAP 10 mmol/L      BUN 11 mg/dL      Creatinine 0.69 mg/dL      Glucose 141 mg/dL      Calcium 9.8 mg/dL      AST 15 U/L      ALT 31 U/L      Alkaline Phosphatase 58 U/L      Total Protein 6.7 g/dL      Albumin 4.2 g/dL      Total Bilirubin 0.46 mg/dL      eGFR 93 ml/min/1.73sq m     Narrative:      [facility] Kid[facility] Disease (CKD):   •  Stage 1 with normal or high GFR (GFR > 90 mL/min/1.73 square meters)  •  Stage 2 Mild CKD (GFR = 60-89 mL/min/1.73 square meters)  •  Stage 3A Moderate CKD (GFR = 45-59 mL/min/1.73 square meters)  •  Stage 3B Moderate CKD (GFR = 30-44 mL/min/1.73 square meters)  •  Stage 4 Severe CKD (GFR = 15-29 mL/min/1.73 square meters)  •  Stage 5 End Stage CKD (GFR <15 mL/min/1.73 square meters)  Note: GFR calculation is accurate only with a steady state creatinine    Lipase [[id number]]  (Normal) Collected: 10/02/23 1752    Lab Status: Final result Specimen: Blood from Arm, Right Updated: 10/02/23 1848     Lipase 29 u/L     CBC and differential [[id number]]  (Abnormal) Collected: 10/02/23 1752    Lab Status: Final result Specimen: Blood from Arm, Right Updated: 10/02/23 1835     WBC 13.06 Thousand/uL      RBC 4.78 Million/uL      Hemoglobin 14.0 g/dL      Hematocrit 43.4 %      MCV 91 fL      MCH 29.3 pg      MCHC 32.3 g/dL      RDW 14.1 %      MPV 9.2 fL      Platelets 282 Thousands/uL      nRBC 0 /100 WBCs      Neutrophils Relative 61 %      Immat GRANS % 1 %      Lymphocytes Relative 31 %      Monocytes Relative 6 %      Eosinophils Relative 0 %      Basophils Relative 1 %      Neutrophils Absolute 7.94 Thousands/µL      Immature Grans Absolute 0.10 Thousand/uL      Lymphocytes Absolute 4.08 Thousands/µL      Monocytes Absolute 0.84 Thousand/µL      Eosinophils Absolute 0.04 Thousand/µL      Basophils Absolute 0.06 Thousands/µL     Fingerstick Glucose (POCT) [[id number]]  (Abnormal) Collected: 10/02/23 1822    Lab Status: Final result Updated: 10/02/23 1826     POC Glucose 151 mg/dl                  CT abdomen pelvis wo contrast   Final Result by Ely Cifuentes MD (10/02 2137)      No evidence of acute intra-abdominal or pelvic pathology            Workstation performed: [id number]               Procedures  ECG 12 Lead Documentation Only    Date/Time: 10/2/2023 10:34 PM    Performed by: Mamadou Jerome MD  Authorized by: Mamadou Jerome MD    ECG reviewed by me, the ED Provider: yes    Patient location:  ED and bedside  Interpretation:     Interpretation: non-specific      Interpretation comment:  QTc 460. Rate:     ECG rate:  78    ECG rate assessment: normal    Rhythm:     Rhythm: sinus rhythm    Ectopy:     Ectopy: none    QRS:     QRS axis:  Normal          ED Course  ED Course as of 10/02/23 2237   Mon Oct 02, 2023   1903 WBC(!): 13.06   1903 Absolute Neutrophils(!): 7.94   1903 CBC and differential(!)  Leukocytosis. 1903 Potassium(!): 3.0   1904 Comprehensive metabolic panel(!)  Hypokalemia   1904 Lipase: 29   1905 Blood Pressure: 158/75   1905 Temperature: 97.8 °F (36.6 °C)  Afebrile   1905 SpO2: 97 %   2150 Leukocytes, UA(!): Moderate   2150 RBC, UA: 1-2   2150 Nitrite, UA: Negative   2150 Glucose, UA: Negative   2150 Ketones, UA: Negative   2150 UA w Reflex to Microscopic w Reflex to Culture(!)   2151 HS TnI random(!): 4   2157 CT abdomen pelvis wo contrast  No evidence of acute intra-abdominal or pelvic pathology.   No evidence of diverticulitis, appendicitis. Medical Decision Making  58 y.o. female who has a past medical history of CAD, Diverticulosis, Fatty liver, Fibromyalgia, GERD (gastroesophageal reflux disease), History of DVT presents in ED visit for 13-day non-bloody, nonmucous watery diarrhea and sudden onset abdominal pain since last night. Also reported nausea, subjective fever but no chills, diaphoresis, headache and dizziness. Positive sick contact with diarrhea 2 to 3 weeks ago. Denied recent antibiotics use. History of cholecystectomy, hysterectomy. On physical, diffused abdominal tenderness. Positive rebound pain on LLQ, +Rovsing's sign. Differential diagnosis includes diverticulitis, colitis, IBS, IBD, pancreatitis, viral gastroenteritis, appendicitis, ischemia bowel disease, overflow diarrhea. Will obtain CBC, CMP, lipase, UA, CT abdomen and pelvis. N.p.o. before radiographic findings. Bentyl for abdominal pain relief. Potassium 3 more likely second to recurrent diarrhea and poor p.o. intake. 40 mEq given. Can resume home meds after discharge. Amount and/or Complexity of Data Reviewed  External Data Reviewed: labs, radiology, ECG and notes. Labs: ordered. Decision-making details documented in ED Course. Radiology: ordered. Decision-making details documented in ED Course. Risk  Prescription drug management.             Disposition  Final diagnoses:   Generalized abdominal pain   Diarrhea, unspecified type     Time reflects when diagnosis was documented in both MDM as applicable and the Disposition within this note     Time User Action Codes Description Comment    10/2/2023 10:29 PM Sean Roberts Add [R10.84] Generalized abdominal pain     10/2/2023 10:29 PM Sean Roberts Add [R19.7] Diarrhea, unspecified type       ED Disposition     ED Disposition   Discharge    Condition   Stable    Date/Time   Mon Oct 2, 2023 10:33 PM    Comment   Herrera Hernandez discharge to home/self care.               Follow-up Information     Follow up With Specialties Details Why Contact Info    Araceli Champion DO Family Medicine Schedule an appointment as soon as possible for a visit in 1 week  [address] [address]  [phone]            Patient's Medications   Discharge Prescriptions    DICYCLOMINE (BENTYL) 20 MG TABLET    Take 1 tablet (20 mg total) by mouth 2 (two) times a day       Start Date: 10/2/2023 End Date: --       Order Dose: 20 mg       Quantity: 10 tablet    Refills: 0     No discharge procedures on file. PDMP Review     None           ED Provider  Attending physically available and evaluated Frann Gitelman. I managed the patient along with the ED Attending.     Electronically Signed by           Zenaida Eid MD  10/02/23 8952

## 2023-10-03 LAB
ATRIAL RATE: 78 BPM
P AXIS: 31 DEGREES
PR INTERVAL: 158 MS
QRS AXIS: 2 DEGREES
QRSD INTERVAL: 94 MS
QT INTERVAL: 404 MS
QTC INTERVAL: 460 MS
T WAVE AXIS: 64 DEGREES
VENTRICULAR RATE: 78 BPM

## 2023-10-03 PROCEDURE — 93010 ELECTROCARDIOGRAM REPORT: CPT | Performed by: INTERNAL MEDICINE

## 2023-10-03 NOTE — DISCHARGE INSTRUCTIONS
Please follow-up with your PCP within 1 week after discharge or return back to ED if any worsening symptoms i.e. intractable nausea/vomiting, unbearable abdominal pain, uncontrollable diarrhea, headache, dizziness, palpitation, chest pain, shortness of breath. Please follow-up with your GI doctor as a scheduled.

## 2023-10-03 NOTE — ED ATTENDING ATTESTATION
10/2/2023  Andres Castano DO, saw and evaluated the patient. I have discussed the patient with the resident/non-physician practitioner and agree with the resident's/non-physician practitioner's findings, Plan of Care, and MDM as documented in the resident's/non-physician practitioner's note, except where noted. All available labs and Radiology studies were reviewed. I was present for key portions of any procedure(s) performed by the resident/non-physician practitioner and I was immediately available to provide assistance. At this point I agree with the current assessment done in the Emergency Department. I have conducted an independent evaluation of this patient a history and physical is as follows:    Patient is a 35-year-old female with a history of CAD, diverticulosis, chronic back pain, hypertension, hyperlipidemia who presents with abdominal pain. Patient states that she has also had loose stools for the past 2 weeks. She states that she is constipated at baseline and used a warm water enema at home on 920. She did experience relief, but has since had very loose stools. She describes up to 4 episodes of watery diarrhea per day. She did not have any loose stools yesterday. However she had he had another episode of loose watery stool today. She denies any blood in her stool. She has also had abdominal pain since last evening. She initially described upper abdominal pain which is now generalized. The abdominal pain prompted her visit to the ED today. She denies associated fever, chills, nausea, vomiting. On exam, patient is in no acute distress. Heart is regular rate and rhythm. Breath sounds normal.  Abdomen is obese with generalized tenderness. However most significant tenderness along with rebound in the left lower quadrant. No lower extremity edema. No calf tenderness. CT unremarkable.  Do not suspect acute surgical process including but not limited to acute cholecystitis, acute appendicitis, SBO, mesenteric ischemia, AAA, vascular dissection, vascular occlusion, perforated viscus. Do not suspect intrathoracic cause of abdominal pain. Symptoms improved and patient is tolerating po. Patient received p.o. potassium. QTc is within normal limits. Patient is able to continue p.o. hydration. she is not having large-volume losses as she has only had 1 episode of loose stool today. She is appropriate for discharge and outpatient follow-up. Patient advised to return to ED if symptoms worsen or persist. Patient also advised to follow up with PCP. Portions of the above record have been created with voice recognition software. Occasional wrong word or "sound alike" substitutions may have occurred due to the inherent limitations of voice recognition software. Read the chart carefully and recognize, using context, where substitutions may have occurred. ED Course         Critical Care Time  ECG 12 Lead Documentation Only    Date/Time: 10/2/2023 8:42 PM    Performed by: Idalmis Henson DO  Authorized by: Idalmis Henson DO    ECG reviewed by me, the ED Provider: yes    Patient location:  ED  Previous ECG:     Previous ECG:  Unavailable    Comparison to cardiac monitor: Yes    Comments:      Normal sinus rhythm at a rate of 89 bpm.  Normal intervals. Left axis deviation. Normal QRS. ST depressions in the high lateral leads and lead II. ECG 12 Lead Documentation Only    Date/Time: 10/2/2023 11:52 PM    Performed by: Idalmis Henson DO  Authorized by: Idalmis Henson DO    ECG reviewed by me, the ED Provider: yes    Patient location:  ED  Previous ECG:     Previous ECG:  Compared to current    Similarity:  No change    Comparison to cardiac monitor: Yes    Comments:      Sinus rhythm at a rate of 78 bpm.  Normal intervals. Normal axis. Normal QRS. Nonspecific ST-T wave abnormalities. Similar to previous from ([id number].

## 2023-10-04 ENCOUNTER — OFFICE VISIT (OUTPATIENT)
Dept: SURGERY | Facility: CLINIC | Age: 63
End: 2023-10-04
Payer: COMMERCIAL

## 2023-10-04 VITALS
DIASTOLIC BLOOD PRESSURE: 90 MMHG | HEIGHT: 63 IN | HEART RATE: 80 BPM | WEIGHT: 250.2 LBS | BODY MASS INDEX: 44.33 KG/M2 | SYSTOLIC BLOOD PRESSURE: 145 MMHG

## 2023-10-04 DIAGNOSIS — R10.9 ABDOMINAL PAIN: Primary | ICD-10-CM

## 2023-10-04 DIAGNOSIS — R11.0 NAUSEA: ICD-10-CM

## 2023-10-04 DIAGNOSIS — R19.7 DIARRHEA: ICD-10-CM

## 2023-10-04 PROCEDURE — 99203 OFFICE O/P NEW LOW 30 MIN: CPT | Performed by: SURGERY

## 2023-10-04 NOTE — LETTER
October 4, 2023     Richard Lemus DO  [address] [address]    Patient: Keyonna Haywood   YOB: 1960   Date of Visit: 10/4/2023       Dear Dr. Renzo Landeros: Thank you for referring Bob Mayfield to me for evaluation. Below are my notes for this consultation. If you have questions, please do not hesitate to call me. I look forward to following your patient along with you. Sincerely,        Ulicamilo Philip AlexandrameghaDO        CC: No Recipients    Ulicamilo Palacios Enedelia ruiz DO  10/4/2023 11:51 AM  Sign when Signing Visit  Assessment/Plan:    Diagnoses and all orders for this visit:    Abdominal pain    Nausea    Diarrhea    Suspect she has underlying irritable bowel syndrome. General this is been a longstanding issue for many years. The diarrhea is new over the last 2 weeks. Normally she is quite constipated. Recommended she take fiber supplementation. Given her the number for the main gastroenterology number at [facility]. Given a normal EGD in 2021 as well as a normal colonoscopy in 2015 I would prefer she pursue a gastroenterology work-up rather than me doing screening scopes. Subjective:     Patient ID: Keyonna Haywood is a 58 y.o. female. Patient presents for pre EGD and colonoscopy consult. States family history of colon cancer and diverticulitis. She has had diarrhea for 2 weeks and has reflux and indigestion. Last colonoscopy at [facility] was 8/27/2015 revealing melanosis coli  Last EGD was 8/10/2021. This was done through [facility] and no specific findings were noted. She had a longstanding history of constipation for at least 10 years. She states the stools are quite hard. Wax was recommended in the past but she states she gained weight on this. She has seen gastroenterology in the past but she states they seem to focus on weight and the need for weight loss. She had a longstanding history of reflux disease for almost 20 years.   She is on Prilosec 20 mg daily. She had an open cholecystectomy in the mid 1980s. She is get along right paramedian scar. She also has back issues which make it difficult for her to ambulate. She was in the emergency room earlier this week. CT scan was performed revealing no specific abnormality. The following portions of the patient's history were reviewed and updated as appropriate:     She  has a past medical history of Anemia, Anusitis, CAD (coronary artery disease) (2008), Colon polyp, Deep vein thrombosis ([address]), Diabetes mellitus ([address]), Diverticulosis (2006), Fatty liver, Fibrocystic breast, Fibromyalgia, Fibromyalgia, primary, GERD (gastroesophageal reflux disease), H. pylori infection (12/1999), History of DVT (deep vein thrombosis), History of panic attacks, Hyperlipidemia, Hypertension, Obesity, PONV (postoperative nausea and vomiting), and Thyroid nodule. She  has a past surgical history that includes Colonoscopy (2006); Colonoscopy (2011); Hysterectomy; Cholecystectomy (1984); Foot surgery; Combined reduction mammaplasty w/ abdominoplasty (2005); Replacement total knee; Carpal tunnel release (Right, 03/2013); Cardiac surgery; Wrist surgery (Right); Joint replacement (Left); US guided thyroid biopsy (11/15/2017); Breast surgery; and Colonoscopy. Her family history includes Cancer in her mother; Colon cancer in her maternal uncle and mother; Colon polyps in her brother and maternal grandmother; Diabetes in her father; Diverticulitis in her sister and sister; Heart disease in her father, paternal grandfather, and paternal grandmother; Obesity in her brother, father, mother, paternal grandfather, paternal grandmother, sister, and sister; Stroke in her maternal grandfather, paternal grandfather, and paternal grandmother. She  reports that she quit smoking about 37 years ago. Her smoking use included cigarettes. She has a 7.50 pack-year smoking history.  She has never used smokeless tobacco. She reports that she does not currently use alcohol. She reports that she does not use drugs. Current Outpatient Medications   Medication Sig Dispense Refill   • 5-Hydroxytryptophan (5-HTP) 100 MG CAPS Take 100 mg by mouth see administration instructions 1-3     • ascorbic acid (VITAMIN C) 250 mg tablet Take 500 mg by mouth 2 (two) times a day     • aspirin (ECOTRIN LOW STRENGTH) 81 mg EC tablet Take by mouth     • B Complex Vitamins (VITAMIN B COMPLEX PO) Take 1 capsule by mouth 2 (two) times a day     • bisoprolol-hydrochlorothiazide (ZIAC) 10-6.25 MG per tablet Take 1 tablet by mouth every morning     • Cholecalciferol (D3-1000 PO) Take 5,000 Int'l Units/day by mouth in the morning     • cyclobenzaprine (FLEXERIL) 10 mg tablet take 1 tablet by mouth nightly if needed muscle spasm (Patient not taking: Reported on 8/3/2022)     • dicyclomine (BENTYL) 20 mg tablet Take 1 tablet (20 mg total) by mouth 2 (two) times a day 10 tablet 0   • DULoxetine (CYMBALTA) 30 mg delayed release capsule 90 mg      • DULoxetine (CYMBALTA) 60 mg delayed release capsule      • DULoxetine HCl 60 MG CSDR      • FIBER PO Take by mouth     • furosemide (LASIX) 20 mg tablet take 1 tablet by mouth every morning for 6 days then CALL OFFICE WITH UPDATE (HOLD ONTO EXTRA TABS)     • HYDROcodone-acetaminophen (NORCO) 5-325 mg per tablet TAKE 1-2 TABLETS BY MOUTH EVERY 12 (TWELVE) HOURS AS NEEDED FOR PAIN. MAX DAILY AMOUNT: 4 TABLETS.      • metFORMIN (GLUCOPHAGE-XR) 750 mg 24 hr tablet      • NON FORMULARY Actiflex     • NON FORMULARY 1250     • NON FORMULARY Adrenogen     • NON FORMULARY Cytoyme PT/HPT     • omeprazole (PriLOSEC) 20 mg delayed release capsule Take 1 capsule (20 mg total) by mouth daily 30 capsule 3   • Potassium 99 MG TABS Take by mouth     • Probiotic Product (PROBIOTIC PO) Take by mouth     • Probiotic Product (ULTRAFLORA IMMUNE HEALTH PO) Take by mouth     • semaglutide, 0.25 or 0.5 mg/dose, (Ozempic, 0.25 or 0.5 MG/DOSE,) 2 mg/3 mL injection pen Inject 0.5mg subcutaneously (abdomen, thigh) once weekly 3 mL 0   • terbinafine (LamISIL) 250 mg tablet take 1 tablet by mouth once daily for 10 days     • traMADol (ULTRAM) 50 mg tablet      • Zinc Acetate, Oral, (ZINC ACETATE PO) Take by mouth       No current facility-administered medications for this visit. She is allergic to codeine, iv contrast [iodinated contrast media], other, ampicillin, cimetidine, clam shell - food allergy, fentanyl, morphine, oxycodone-acetaminophen, and tetracycline. .    Review of Systems   Gastrointestinal: Positive for abdominal pain, diarrhea and nausea. All other systems reviewed and are negative. Objective:      /90   Pulse 80   Ht 5' 2.5" (1.588 m)   Wt 113 kg (250 lb 3.2 oz)   BMI 45.03 kg/m²         Physical Exam  Constitutional:       General: She is not in acute distress. Appearance: She is obese. HENT:      Mouth/Throat:      Mouth: Mucous membranes are moist.   Eyes:      Extraocular Movements: Extraocular movements intact. Abdominal:      General: Bowel sounds are normal.      Palpations: Abdomen is soft. There is no mass. Tenderness: There is no abdominal tenderness. Skin:     General: Skin is warm and dry.

## 2023-10-04 NOTE — PROGRESS NOTES
Assessment/Plan:    Diagnoses and all orders for this visit:    Abdominal pain    Nausea    Diarrhea    Suspect she has underlying irritable bowel syndrome. General this is been a longstanding issue for many years. The diarrhea is new over the last 2 weeks. Normally she is quite constipated. Recommended she take fiber supplementation. Given her the number for the main gastroenterology number at [facility]. Given a normal EGD in 2021 as well as a normal colonoscopy in 2015 I would prefer she pursue a gastroenterology work-up rather than me doing screening scopes. Subjective:      Patient ID: Aaron Jacobs is a 58 y.o. female. Patient presents for pre EGD and colonoscopy consult. States family history of colon cancer and diverticulitis. She has had diarrhea for 2 weeks and has reflux and indigestion. Last colonoscopy at [facility] was 8/27/2015 revealing melanosis coli  Last EGD was 8/10/2021. This was done through [facility] and no specific findings were noted. She had a longstanding history of constipation for at least 10 years. She states the stools are quite hard. Wax was recommended in the past but she states she gained weight on this. She has seen gastroenterology in the past but she states they seem to focus on weight and the need for weight loss. She had a longstanding history of reflux disease for almost 20 years. She is on Prilosec 20 mg daily. She had an open cholecystectomy in the mid 1980s. She is get along right paramedian scar. She also has back issues which make it difficult for her to ambulate. She was in the emergency room earlier this week. CT scan was performed revealing no specific abnormality.       The following portions of the patient's history were reviewed and updated as appropriate:     She  has a past medical history of Anemia, Anusitis, CAD (coronary artery disease) (2008), Colon polyp, Deep vein thrombosis ([address]), Diabetes mellitus ([address]), Diverticulosis (2006), Fatty liver, Fibrocystic breast, Fibromyalgia, Fibromyalgia, primary, GERD (gastroesophageal reflux disease), H. pylori infection (12/1999), History of DVT (deep vein thrombosis), History of panic attacks, Hyperlipidemia, Hypertension, Obesity, PONV (postoperative nausea and vomiting), and Thyroid nodule. She  has a past surgical history that includes Colonoscopy (2006); Colonoscopy (2011); Hysterectomy; Cholecystectomy (1984); Foot surgery; Combined reduction mammaplasty w/ abdominoplasty (2005); Replacement total knee; Carpal tunnel release (Right, 03/2013); Cardiac surgery; Wrist surgery (Right); Joint replacement (Left); US guided thyroid biopsy (11/15/2017); Breast surgery; and Colonoscopy. Her family history includes Cancer in her mother; Colon cancer in her maternal uncle and mother; Colon polyps in her brother and maternal grandmother; Diabetes in her father; Diverticulitis in her sister and sister; Heart disease in her father, paternal grandfather, and paternal grandmother; Obesity in her brother, father, mother, paternal grandfather, paternal grandmother, sister, and sister; Stroke in her maternal grandfather, paternal grandfather, and paternal grandmother. She  reports that she quit smoking about 37 years ago. Her smoking use included cigarettes. She has a 7.50 pack-year smoking history. She has never used smokeless tobacco. She reports that she does not currently use alcohol. She reports that she does not use drugs.   Current Outpatient Medications   Medication Sig Dispense Refill   • 5-Hydroxytryptophan (5-HTP) 100 MG CAPS Take 100 mg by mouth see administration instructions 1-3     • ascorbic acid (VITAMIN C) 250 mg tablet Take 500 mg by mouth 2 (two) times a day     • aspirin (ECOTRIN LOW STRENGTH) 81 mg EC tablet Take by mouth     • B Complex Vitamins (VITAMIN B COMPLEX PO) Take 1 capsule by mouth 2 (two) times a day     • bisoprolol-hydrochlorothiazide (ZIAC) 10-6.25 MG per tablet Take 1 tablet by mouth every morning     • Cholecalciferol (D3-1000 PO) Take 5,000 Int'l Units/day by mouth in the morning     • cyclobenzaprine (FLEXERIL) 10 mg tablet take 1 tablet by mouth nightly if needed muscle spasm (Patient not taking: Reported on 8/3/2022)     • dicyclomine (BENTYL) 20 mg tablet Take 1 tablet (20 mg total) by mouth 2 (two) times a day 10 tablet 0   • DULoxetine (CYMBALTA) 30 mg delayed release capsule 90 mg      • DULoxetine (CYMBALTA) 60 mg delayed release capsule      • DULoxetine HCl 60 MG CSDR      • FIBER PO Take by mouth     • furosemide (LASIX) 20 mg tablet take 1 tablet by mouth every morning for 6 days then CALL OFFICE WITH UPDATE (HOLD ONTO EXTRA TABS)     • HYDROcodone-acetaminophen (NORCO) 5-325 mg per tablet TAKE 1-2 TABLETS BY MOUTH EVERY 12 (TWELVE) HOURS AS NEEDED FOR PAIN. MAX DAILY AMOUNT: 4 TABLETS. • metFORMIN (GLUCOPHAGE-XR) 750 mg 24 hr tablet      • NON FORMULARY Actiflex     • NON FORMULARY 1250     • NON FORMULARY Adrenogen     • NON FORMULARY Cytoyme PT/HPT     • omeprazole (PriLOSEC) 20 mg delayed release capsule Take 1 capsule (20 mg total) by mouth daily 30 capsule 3   • Potassium 99 MG TABS Take by mouth     • Probiotic Product (PROBIOTIC PO) Take by mouth     • Probiotic Product ([address]) Take by mouth     • semaglutide, 0.25 or 0.5 mg/dose, (Ozempic, 0.25 or 0.5 MG/DOSE,) 2 mg/3 mL injection pen Inject 0.5mg subcutaneously (abdomen, thigh) once weekly 3 mL 0   • terbinafine (LamISIL) 250 mg tablet take 1 tablet by mouth once daily for 10 days     • traMADol (ULTRAM) 50 mg tablet      • Zinc Acetate, Oral, (ZINC ACETATE PO) Take by mouth       No current facility-administered medications for this visit. She is allergic to codeine, iv contrast [iodinated contrast media], other, ampicillin, cimetidine, clam shell - food allergy, fentanyl, morphine, oxycodone-acetaminophen, and tetracycline. .    Review of Systems Gastrointestinal: Positive for abdominal pain, diarrhea and nausea. All other systems reviewed and are negative. Objective:      /90   Pulse 80   Ht 5' 2.5" (1.588 m)   Wt 113 kg (250 lb 3.2 oz)   BMI 45.03 kg/m²          Physical Exam  Constitutional:       General: She is not in acute distress. Appearance: She is obese. HENT:      Mouth/Throat:      Mouth: Mucous membranes are moist.   Eyes:      Extraocular Movements: Extraocular movements intact. Abdominal:      General: Bowel sounds are normal.      Palpations: Abdomen is soft. There is no mass. Tenderness: There is no abdominal tenderness. Skin:     General: Skin is warm and dry.

## 2023-11-09 ENCOUNTER — OFFICE VISIT (OUTPATIENT)
Dept: FAMILY MEDICINE CLINIC | Facility: CLINIC | Age: 63
End: 2023-11-09
Payer: COMMERCIAL

## 2023-11-09 VITALS
HEIGHT: 63 IN | BODY MASS INDEX: 45.36 KG/M2 | DIASTOLIC BLOOD PRESSURE: 80 MMHG | WEIGHT: 256 LBS | TEMPERATURE: 98.2 F | SYSTOLIC BLOOD PRESSURE: 138 MMHG | OXYGEN SATURATION: 94 % | HEART RATE: 65 BPM

## 2023-11-09 DIAGNOSIS — G89.4 CHRONIC PAIN SYNDROME: ICD-10-CM

## 2023-11-09 DIAGNOSIS — E66.01 MORBID OBESITY (HCC): ICD-10-CM

## 2023-11-09 DIAGNOSIS — R42 VERTIGO: ICD-10-CM

## 2023-11-09 DIAGNOSIS — F11.20 CONTINUOUS OPIOID DEPENDENCE (HCC): ICD-10-CM

## 2023-11-09 DIAGNOSIS — R25.1 TREMOR: ICD-10-CM

## 2023-11-09 DIAGNOSIS — R19.7 DIARRHEA, UNSPECIFIED TYPE: ICD-10-CM

## 2023-11-09 DIAGNOSIS — E11.9 TYPE 2 DIABETES MELLITUS WITHOUT COMPLICATION, UNSPECIFIED WHETHER LONG TERM INSULIN USE (HCC): ICD-10-CM

## 2023-11-09 DIAGNOSIS — E65 CENTRAL OBESITY: ICD-10-CM

## 2023-11-09 DIAGNOSIS — Z76.89 ENCOUNTER TO ESTABLISH CARE WITH NEW DOCTOR: Primary | ICD-10-CM

## 2023-11-09 DIAGNOSIS — I10 HYPERTENSION, BENIGN: ICD-10-CM

## 2023-11-09 DIAGNOSIS — F06.31 DEPRESSION DUE TO PHYSICAL ILLNESS: ICD-10-CM

## 2023-11-09 LAB — SL AMB POCT HEMOGLOBIN AIC: 6.2 (ref ?–6.5)

## 2023-11-09 PROCEDURE — 83036 HEMOGLOBIN GLYCOSYLATED A1C: CPT | Performed by: FAMILY MEDICINE

## 2023-11-09 PROCEDURE — 99204 OFFICE O/P NEW MOD 45 MIN: CPT | Performed by: FAMILY MEDICINE

## 2023-11-09 RX ORDER — MECLIZINE HYDROCHLORIDE 25 MG/1
25 TABLET ORAL 3 TIMES DAILY PRN
COMMUNITY
Start: 2023-10-21 | End: 2024-10-20

## 2023-11-09 RX ORDER — POTASSIUM CHLORIDE 750 MG/1
CAPSULE, EXTENDED RELEASE ORAL
COMMUNITY
Start: 2023-08-26 | End: 2023-11-09

## 2023-11-09 RX ORDER — HYDROCHLOROTHIAZIDE 12.5 MG/1
TABLET ORAL
COMMUNITY
Start: 2023-10-19

## 2023-11-09 RX ORDER — ESCITALOPRAM OXALATE 5 MG/1
15 TABLET ORAL
COMMUNITY
Start: 2023-10-16 | End: 2024-01-14

## 2023-11-09 NOTE — PROGRESS NOTES
Name: Casey Pereira      : 1960      MRN: [id number]  Encounter Provider: Leland Iraheta DO  Encounter Date: 2023   Encounter department: [facility]ILY [facility] AT [address] [place] [address]     1. Encounter to establish care with new doctor    2. Vertigo  -     Ambulatory Referral to Physical Therapy; Future    3. Type 2 diabetes mellitus without complication, unspecified whether long term insulin use (HCC)  -     POCT hemoglobin A1c    4. Depression due to physical illness    5. Diarrhea, unspecified type    6. Tremor    7. Chronic pain syndrome    8. Continuous opioid dependence ([address])    9. Hypertension, benign    10. Morbid obesity ([address])    11.  Central obesity    Pt will keep her appt already scheduled with ENT for January and if no benefit with vestib thx for the vertigo, will have ENT eval; continue PRN meclizine- does not need refill today  She has also appt with GI in January for her chronic diarrhea sx  Her diabetes is ideally controlled; she is interested in setting up diabetic education, but after other specialist eval's completed  Her depression is managed by psychology/psychiatry, cpm  She has grossly visible head tremor during visit and shows hand tremors as well, stating that she had seen neurology in the past for this and was told "no signs of Parkinson's" - she has noticed worsening over time - I advise pt that I will review her old records in detail for this neurologist visit/eval and proceed with further eval/work-up as warranted  Her chronic pain and pain meds are managed by pain specialist, cpm  HTN not ideally controlled today, but reasonably well-controlled, cpm and monitoring  Pt has morbid obesity and is centrally obese - no moon facies or true buffalo hump, though she does have adiposity at posterior neck area on exam- will also check records for any testing for Cushing's syndrome done in the past  Extensive visit length today face-to-face greater than 60 minutes due to complexity of numerous medical problems, new patient visit, and includes counseling, record and results review - most from outside sources- pt education, discussion of assessment and plan, coordination of care     Depression Screening and Follow-up Plan: Patient was screened for depression during today's encounter. They screened negative with a PHQ-2 score of 2.         Subjective      Chief Complaint   Patient presents with    Establish Care     New pt       New pt - used to see [facility]  Has DM2- due for HbA1c, so we do POCT  -   pt states after I advise her of the HbA1c result "I had one doctor say that anything over 5.7 is diabetes and another one say that if it's around 6 it's good"- I explain that once dx diabetes never goes away, always have, but if HbA1c is in 6% range is considered excellent control of DM2  Pt takes some time to talk about and ask questions about her diabetes and diet and trying to lose weight, which I answer  I ask and pt states she has never seen diab educ  Dx DM 2 about 40 yrs ago  Reconciling meds- cymbalta and lexapro on list as actively taking - I ask who rx'd these and for what, and pt answers, "My rheumatologist sent me to neurologist and neurologist sent me to psychiatrist"  I ask what she was sent to neurology for, and she discusses having fallen on vacation at beach "and couldn't get up, 3 men had to help me up," then saw her rheum for numbness and tingling and low back pain, then states "Dr. John Chavez" told her "to see neurologist" - "he examined me and said there were a couple spots in my legs that were weak, but they were normal, then he sat me down and said that I was depressed and that my depression was basically manifesting itself in pain, because I'm depressed, I have extra pain"  I ask pt who Dr. John Chavez is, and she states, "They wouldn't give me anything but tramadol for my back, so I see Dr. John Chavez at pain management for my back"  Admits she started seeing the therapist at [facility] and will see their psychiatrist next week, "I'm also getting biblical counseling from my son who is a  in [place]"  Dr. Latrell Brantley for diab eye exams - last was in the summer  Dr. Lashonda Zaragoza for podiatry/diab foot exams - last was about 6 months ago  Colonoscopy 1999 first "same year my mother passed away from colon cancer - every 5 years since until this last it's been 7 years"  Also reports, "I was in [facility] for diarrhea- I still have diarrhea- I had a GI doctor - one of his associates was my doctor before him, then I went to him and he lectured me for being morbidly obese, and I have something wrong that I can't lose weight- was on slimfast and gained weight- have had diarrhea for 3 months and haven't lost a pound" - "I had been constipated- severely to the point that have to use an enema to help- until - I know exactly when - 3 months ago the diarrhea started because we were in [place] for my son's wedding"  "In the ER they were going to keep me and do a colonoscopy and an endocosopy, but they did a cat scan and it showed nothing, so they sent me home"  "So I did go up here in [facility] at the [address] different GI doctor) at the beginning of the year and he came in and said, 'you're not due for any tests' - he had no sense of humor- if I didn't keep a sense of humor with all this I'd go crazy- That's why I started calling myself Dianne Mcgregor - round in the middle and falling apart"  She discusses seeing her ortho, Dr. Marielena Meier, and he recommended the GI doctor he sees, "so I called right away and made an appt but it's not til January"  "The biggest thing I wanted to be seen for now is this vertigo - going on now tomorrow is 3 weeks- I did go to the [facility] up in [place] the day after it started because it was so bad- sometimes it helps a little bit, sometimes it doesn't do anything ( ie the meclisine that was rx'd)- sometimes it isn't so bad and sometimes I can't even walk without holding onto something"  We discuss vestib therapy, and she is agreeable to trying  Pt also c/o "My ears are ringing, dont know if they're blocked, they pop once in awhile"  Does not need meclizine refill  I mention the slight head tremor that I notice during HPI - pt states she has known about this for a long time- is getting worse - and her hands shake as well - states she saw (the same) neurologist a few years ago for this and was told no signs of parkinsons  Pt states, "Want to switch to [facility] for things" but stay with her ortho at [facility]  Also reports, "Started to go to a functional medicine group"      [facility]  Outside Information  HCV AB PROFILE  Specimen: Blood - Serum specimen (specimen)  Component  HCV Ab, EIA Screen  Negative Negative    Interpretation SEE NOTES Repeat screening test in 1 to 3 months for high-risk patients. Resulting Agency [facility] ([facility])   Specimen Collected: 04/04/23  8:32 AM   Performed by: [facility] ([facility]) Last Resulted: 04/04/23  5:48 PM  Received From: [facility]             10/10/2023  [address] [place] [address]  Progress Notes  - documented in this encounter  Viri Zavala DO - 10/10/2023 9:00 AM EDT  Formatting of this note is different from the original.  Subjective:  Keyonna Haywood is a 58 y.o. female who presents today for  Chief Complaint  Patient presents with  Leg Swelling  Both legs, has been going on for a while, was previously on lasix and a water pill.  Leg are painful, hurts to walk, sore to touch, has taken norco for the pain  Leg swelling  Legs and feet hurting  Feet get cold, turn blue when sitting for a long time  Lab Results  Component Value Date  TSH 1.02 04/04/2023  Swelling has worsened for the past few months  Mild diuretic helped a little but came, back, then was given lasix which helped but came back after stopping lasix  When she had diarrhea did not have swelling  But now she is having swelling again  Elevating at night and during the day, but still swollen, not helping  Has had labile weights since having diarrhea  Has always had a hard time losing weight, no recent extensive weight gain  No shortness of breath  Has some chest pain with laying back at night, has acid reflux  Does have shortness of breath with exertion and with getting dressed  Shortness of breath is chronic but worsening the past few weeks  Does have sleep apnea, uses her cpap machine at night and with napping  The swelling has been on and off for her whole life  Now she is also getting some red marks on her legs  Is having weakness on her legs, so had EMG done  L leg and foot having some tingling, has disc protrusion  Has had lack of strength for many years  Has seen rheu, pain for this, and is seeing neurology on Monday for this  Feet pain  Cold feet  Has been going on for a long time  Does have pain and aching in her legs with walking around that get better at rest  No studies done in the past  Does not have to lay legs over bed to reduce pain, no pain in legs with elevation  The following portions of the patient's history were reviewed and updated as appropriate: allergies, current medications, past family history, past medical history, past social history, past surgical history and problem list.  Past Medical History:  Diagnosis Date  Anemia  Angina pectoris ([address])  Anxiety  Chronic pain syndrome  Constipation  COVID-19 11/2020  CTS (carpal tunnel syndrome)  Diabetes mellitus type 2, controlled ([address])  diet controlled  DJD (degenerative joint disease)  DVT (deep venous thrombosis) ([address]) 2011  left leg after knee replacement surgery  Extensor tenosynovitis of right wrist  Fatigue, unspecified type  Fibromyalgia, primary  GERD (gastroesophageal reflux disease)  Gestational diabetes  Headache  Hyperlipidemia  Hypertension  Irritable bowel syndrome  Low back pain  Lumbar foraminal stenosis  Lumbar radiculitis  Lumbar radiculopathy  Lumbar spondylosis  Numbness and tingling in both hands  Obesity  Osteoarthritis of knee  PONV (postoperative nausea and vomiting)  Protrusion of lumbar intervertebral disc  Restless leg syndrome  Right wrist pain  Shortness of breath  Sleep apnea  CPAP with face mask  Symptomatic menopausal or female climacteric states  TMJ click  Urinary tract infection  Vaginal delivery  x3  Varicella  Past Surgical History:  Procedure Laterality Date  CARDIAC CATHETERIZATION  CARPAL TUNNEL RELEASE Right  CHOLECYSTECTOMY 06/1987  COLONOSCOPY 2004 2009  COLONOSCOPY N/A 02/04/2020  Performed by Favio Veliz MD at [facility]  pt denies  CORONARY ANGIOPLASTY  HYSTERECTOMY 1993  vaginal-- uterine prolapse  HYSTEROSCOPY partial 1993  JOINT REPLACEMENT Left 2008  knee  KNEE ARTHROSCOPY Left  KNEE SURGERY Left 2008 & 2010  2008 knee replacement; 2010 revision  PLANTAR FASCIA SURGERY Bilateral  OK COLONOSCOPY W/BIOPSY SINGLE/MULTIPLE N/A 08/27/2015  Procedure: COLONOSCOPY, COLON BIOPSY; Surgeon: Kelsey Orr MD; Location:  GI OR; Service: Gastroenterology  REDUCTION MAMMAPLASTY  REVISION TOTAL KNEE ARTHROPLASTY Left 06/14/2010  Dr. Neftali Galeana Right 6/21/2021  Performed by Jeannie Araujo DO at [facility] OR  VAGINAL DELIVERY  x 3  WISDOM TOOTH EXTRACTION  WRIST ARTHROSCOPY Right 11/15/2018  R wrist arthroscopy with SL and TFCC debridement, R ECU debridement/tenosynovectomy, R ECU sub-sheath reconstruction. Dr. Logan Combs Right 04/03/2015  OAA; carpal tunnel  WRIST SURGERY Right  nerve surgery  WRIST SURGERY Right 12/07/2020  S/P excision right ulno-carpal ganglion cyst and neurolysis right dorsal sensory ulnar nerve.  Monae Wright DO  Family History  Problem Relation Age of Onset  Cancer Mother  colon  Hypertension Mother  Diabetes Father  Hypertension Father  Heart failure Father  Heart disease Father  Colon cancer Father  Hyperlipidemia Father  Vaginal cancer Sister  Diverticulitis Sister  Social History  Tobacco Use  Smoking status: Former  Packs/day: 1.50  Years: 6.00  Pack years: 9.00  Types: Cigarettes  Quit date: 1986  Years since quittin.5  Passive exposure: Past  Smokeless tobacco: Never  Vaping Use  Vaping Use: Never used  Substance Use Topics  Alcohol use: No  Drug use: No  Current Outpatient Medications  Medication Sig  ascorbic Acid (VITAMIN C) 500 mg cpER every morning. bisoproloL-hydrochlorothiazide (ZIAC) 10-6.25 mg per tablet Take 1 tablet by mouth every morning Indications: high blood pressure. cholecalciferol, vitamin D3, 1,000 unit capsule Take 5,000 Units by mouth every morning. DULoxetine (CYMBALTA) 60 MG capsule Take 1 capsule (60 mg total) by mouth every morning. hydroCHLOROthiazide (HYDRODIURIL) 12.5 MG tablet take 1 tablet by mouth once daily if needed for SWELLING for legs  HYDROcodone-acetaminophen (NORCO) 5-325 mg per tablet Take 1-2 tablets by mouth every 12 (twelve) hours as needed for pain. Max Daily Amount: 4 tablets  metFORMIN (GLUCOPHAGE-XR) 750 MG 24 hr tablet Take 1 tablet (750 mg total) by mouth daily with breakfast.  MISCELLANEOUS SUPPLEMENT Supplement Name: Health feet an nerves , Takes 2 daily WARNING: no drug-allergy or drug-interaction checking exists for this entry  omega-3 fatty acids/fish oil (fish oil-omega-3 fatty acids) 300-1,000 mg capsule Take 2 capsules (2 g total) by mouth daily. omeprazole 20 mg TbLD Take by mouth daily. potassium chloride (MICRO-K) 10 MEQ CR capsule Take 1 capsule (10 mEq total) by mouth daily for 5 days. UNABLE TO FIND [facility]    No current facility-administered medications for this visit. Review of Systems  Constitutional: Positive for fatigue. Negative for activity change, chills and fever. Respiratory: Positive for apnea, shortness of breath and dyspnea with exertion. Negative for cough. Cardiovascular: Positive for claudication and leg swelling.  Negative for chest pain.  Gastrointestinal: Negative for constipation, diarrhea and nausea. Musculoskeletal: Positive for back pain. Negative for muscle aches. Skin: Positive for dry skin. Mental Health: Positive for stress. 4/3/2023  5:40 PM  Depression Screen  Little interest or pleasure in doing things 0 - not at all  Feeling down, depressed, or hopeless 0 - not at all  PHQ Score 0    Objective:  /74 (BP Location: Left arm, Patient Position: Sitting, BP Cuff Size: Large Adult)  Pulse 73  Temp 97.5 °F (36.4 °C) (Temporal)  Resp 18  Ht 1.575 m (5' 2")  Wt 118 kg (259 lb 12.8 oz)  SpO2 99%  BMI 47.52 kg/m²  Physical Exam  Vitals reviewed. Constitutional:  General: She is not in acute distress. Appearance: She is not diaphoretic. HENT:  Head: Normocephalic and atraumatic. Right Ear: External ear normal.  Left Ear: External ear normal.  Eyes:  Extraocular Movements: EOM normal.  Cardiovascular:  Rate and Rhythm: Normal rate and regular rhythm. Heart sounds: Normal heart sounds. No murmur heard. No friction rub. No gallop. Comments: Dylan 1+ edema to below knee  No statis ulcers or dermatitis present at this time  No JVD  Pulmonary:  Effort: Pulmonary effort is normal.  Breath sounds: Normal breath sounds. Musculoskeletal:  General: Edema present. Right lower le+ Pitting Edema present. Left lower le+ Pitting Edema present. Comments: Has cyanotic splotches on feet when sitting in the chair  With elevation, the cyanosis resolves and feet are normal in color  Feet are warm to touch Neurological:  Mental Status: She is alert.   Psychiatric:  Mood and Affect: Mood and affect normal.  Behavior: Behavior normal.    Right Foot: Pulses: Dorsalis Pedis: normal, Posterior Tibialis: normal    Left Foot: Pulses: Dorsalis Pedis: normal, Posterior Tibialis: normal.    Assessment/Plan:    #1 Bilateral leg edema (Primary)  Comments:  Most likely due to obesity, venous stasis, nerve conduction issues, deconditioning but now also with dyspnea on exertion, concerned for cardiac cause, will get echo  She is on bisoprolol which may cause edema but this is a chronic medication for her and unlikely the only etiology. Additionally, SIXTO is known to cause edema and she has had this for many years. Does use CPAP. Orders:  - ECHO 2D COMPLETE; Future; Expected date: 10/10/2023  - AMB REF [facility] LYMPHEDEMA PT/OT    #2 Exertional dyspnea  Comments:  May be due to obesity and decondition but she states it is progressing. Last stress echo done in 2018 is normal, will repeat Echo to check EF  Orders:  - ECHO 2D COMPLETE; Future; Expected date: 10/10/2023    #3 Claudication [facility])  Comments:  Given claudication and edema, will get ABIs, if normal will recommend compression stockings to help with edema. Gave ER precautions for sudden cold, pain, loss of color or sensation in a limb. Orders:  - VAS PHYSIOLOGIC ARTERIAL DOPPLER LOWER EXTREMITY MULTI LEVEL BILATERAL; Future; Expected date: 10/10/2023    Return in about 2 months (around 12/10/2023) for Recheck edema. Electronically signed by Verónica Ochoa DO at 10/10/2023 9:57 AM EDT      Review of Systems    Current Outpatient Medications on File Prior to Visit   Medication Sig    ascorbic acid (VITAMIN C) 250 mg tablet Take 500 mg by mouth 2 (two) times a day    bisoprolol-hydrochlorothiazide (ZIAC) 10-6.25 MG per tablet Take 1 tablet by mouth every morning    Cholecalciferol (D3-1000 PO) Take 5,000 Int'l Units/day by mouth in the morning    DULoxetine (CYMBALTA) 60 mg delayed release capsule     escitalopram (LEXAPRO) 5 mg tablet Take 15 mg by mouth    hydrochlorothiazide (HYDRODIURIL) 12.5 mg tablet take 1 tablet by mouth once daily if needed for SWELLING IN LEGS    HYDROcodone-acetaminophen (NORCO) 5-325 mg per tablet TAKE 1-2 TABLETS BY MOUTH EVERY 12 (TWELVE) HOURS AS NEEDED FOR PAIN. MAX DAILY AMOUNT: 4 TABLETS.     meclizine (ANTIVERT) 25 mg tablet Take 25 mg by mouth Three times daily as needed    metFORMIN (GLUCOPHAGE-XR) 750 mg 24 hr tablet     NON FORMULARY 1250    omeprazole (PriLOSEC) 20 mg delayed release capsule Take 1 capsule (20 mg total) by mouth daily    Potassium 99 MG TABS Take by mouth    terbinafine (LamISIL) 250 mg tablet take 1 tablet by mouth once daily for 10 days    Zinc Acetate, Oral, (ZINC ACETATE PO) Take by mouth    DULoxetine HCl 60 MG CSDR  (Patient not taking: Reported on 11/9/2023)       Objective     /80 (BP Location: Left arm, Patient Position: Sitting, Cuff Size: Standard)   Pulse 65   Temp 98.2 °F (36.8 °C) (Tympanic)   Ht 5' 2.5" (1.588 m)   Wt 116 kg (256 lb)   SpO2 94%   BMI 46.08 kg/m²     Physical Exam  Jackie Nguyen DO

## 2023-11-14 PROBLEM — R42 VERTIGO: Status: ACTIVE | Noted: 2023-11-14

## 2023-11-14 PROBLEM — R25.1 TREMOR: Status: ACTIVE | Noted: 2023-11-14

## 2023-11-14 PROBLEM — E65 CENTRAL OBESITY: Status: ACTIVE | Noted: 2023-11-14

## 2023-11-14 PROBLEM — R19.7 DIARRHEA: Status: ACTIVE | Noted: 2023-11-14

## 2023-11-14 PROBLEM — G89.4 CHRONIC PAIN SYNDROME: Status: ACTIVE | Noted: 2023-11-14

## 2023-11-17 DIAGNOSIS — R53.83 FATIGUE, UNSPECIFIED TYPE: ICD-10-CM

## 2023-11-17 DIAGNOSIS — E66.01 MORBID OBESITY (HCC): ICD-10-CM

## 2023-11-17 DIAGNOSIS — E65 CENTRAL OBESITY: ICD-10-CM

## 2023-11-17 DIAGNOSIS — M62.81 PROXIMAL MUSCLE WEAKNESS: Primary | ICD-10-CM

## 2023-11-17 DIAGNOSIS — Z86.39 HISTORY OF IRON DEFICIENCY: ICD-10-CM

## 2023-11-17 DIAGNOSIS — R25.1 TREMOR: ICD-10-CM

## 2023-11-17 DIAGNOSIS — I10 HYPERTENSION, BENIGN: ICD-10-CM

## 2023-11-17 RX ORDER — DEXAMETHASONE 1 MG
TABLET ORAL
Qty: 1 TABLET | Refills: 0 | Status: SHIPPED | OUTPATIENT
Start: 2023-11-17

## 2023-11-18 ENCOUNTER — APPOINTMENT (OUTPATIENT)
Dept: LAB | Facility: CLINIC | Age: 63
End: 2023-11-18
Payer: COMMERCIAL

## 2023-11-18 DIAGNOSIS — E65 CENTRAL OBESITY: ICD-10-CM

## 2023-11-18 DIAGNOSIS — R25.1 TREMOR: ICD-10-CM

## 2023-11-18 DIAGNOSIS — Z86.39 HISTORY OF IRON DEFICIENCY: ICD-10-CM

## 2023-11-18 DIAGNOSIS — R53.83 FATIGUE, UNSPECIFIED TYPE: ICD-10-CM

## 2023-11-18 DIAGNOSIS — E66.01 MORBID OBESITY (HCC): ICD-10-CM

## 2023-11-18 DIAGNOSIS — M62.81 PROXIMAL MUSCLE WEAKNESS: ICD-10-CM

## 2023-11-18 DIAGNOSIS — I10 HYPERTENSION, BENIGN: ICD-10-CM

## 2023-11-18 LAB — CORTIS AM PEAK SERPL-MCNC: <0.4 UG/DL (ref 6.7–22.6)

## 2023-11-18 PROCEDURE — 82533 TOTAL CORTISOL: CPT

## 2023-11-18 PROCEDURE — 36415 COLL VENOUS BLD VENIPUNCTURE: CPT

## 2023-11-22 ENCOUNTER — TELEPHONE (OUTPATIENT)
Dept: FAMILY MEDICINE CLINIC | Facility: CLINIC | Age: 63
End: 2023-11-22

## 2023-11-22 DIAGNOSIS — R25.1 TREMOR: ICD-10-CM

## 2023-11-22 DIAGNOSIS — I10 HYPERTENSION, BENIGN: ICD-10-CM

## 2023-11-22 DIAGNOSIS — Z86.39 HISTORY OF IRON DEFICIENCY: ICD-10-CM

## 2023-11-22 DIAGNOSIS — E65 CENTRAL OBESITY: Primary | ICD-10-CM

## 2023-11-22 DIAGNOSIS — M62.81 PROXIMAL MUSCLE WEAKNESS: ICD-10-CM

## 2023-11-22 DIAGNOSIS — R53.83 FATIGUE, UNSPECIFIED TYPE: ICD-10-CM

## 2023-11-22 DIAGNOSIS — E66.01 MORBID OBESITY (HCC): ICD-10-CM

## 2023-11-22 NOTE — TELEPHONE ENCOUNTER
Yes, she can get her cortisone injections on Monday. And please let her know that the dexamethasone suppression test (ie cortisol level) is a normal result - appropriately suppressed -    - however, I have a high suspicion that she might have Cushings, and so I am ordering bedtime oral swab tests to be done twice  - with 2-3 weeks span in between each swab test            All Conversations: Test result  (Newest Message First)  November 22, 2023  Kathleen Pranomi   to Paul Person   [facility]      11/22/23  9:52 AM  Hi Demar Arvizu,     I received your message on [employer] and I did forward it over to [address] [place] [address] to see what she says. Thanks and have a Happy Thanksgiving,  Odilia Blount    Last read by Paul Person at 12:01 PM on 11/22/2023. Leta Prader   to Me   [facility]    11/22/23  9:51 AM  Please advise. Cecilia Hernandez   to [facility] (supporting You)   [facility]      11/22/23  9:44 AM  Good morning Dr. Silvio Agee. I’m sorry to bother you. I have an appointment scheduled for Cortizone injections on Monday and I was wondering whether or not it would be wise to cancel that appointment? I am not sure what the test result shows you, or any of the other results that I sent over yesterday, so I wasn’t sure how to proceed. Thank you for your time.   Demar Arvizu

## 2023-11-27 ENCOUNTER — APPOINTMENT (OUTPATIENT)
Dept: LAB | Facility: CLINIC | Age: 63
End: 2023-11-27

## 2023-11-28 ENCOUNTER — OFFICE VISIT (OUTPATIENT)
Dept: FAMILY MEDICINE CLINIC | Facility: CLINIC | Age: 63
End: 2023-11-28
Payer: COMMERCIAL

## 2023-11-28 VITALS
OXYGEN SATURATION: 96 % | HEIGHT: 63 IN | TEMPERATURE: 99 F | WEIGHT: 255.2 LBS | BODY MASS INDEX: 45.22 KG/M2 | DIASTOLIC BLOOD PRESSURE: 89 MMHG | HEART RATE: 66 BPM | SYSTOLIC BLOOD PRESSURE: 142 MMHG

## 2023-11-28 DIAGNOSIS — R09.81 NASAL CONGESTION: ICD-10-CM

## 2023-11-28 DIAGNOSIS — R05.9 COUGH, UNSPECIFIED TYPE: Primary | ICD-10-CM

## 2023-11-28 LAB
SARS-COV-2 AG UPPER RESP QL IA: NEGATIVE
VALID CONTROL: NORMAL

## 2023-11-28 PROCEDURE — 87811 SARS-COV-2 COVID19 W/OPTIC: CPT | Performed by: FAMILY MEDICINE

## 2023-11-28 PROCEDURE — 99214 OFFICE O/P EST MOD 30 MIN: CPT | Performed by: FAMILY MEDICINE

## 2023-11-28 RX ORDER — AMOXICILLIN AND CLAVULANATE POTASSIUM 875; 125 MG/1; MG/1
1 TABLET, FILM COATED ORAL EVERY 12 HOURS SCHEDULED
Qty: 14 TABLET | Refills: 0 | Status: SHIPPED | OUTPATIENT
Start: 2023-11-28 | End: 2023-12-05

## 2023-11-28 NOTE — PROGRESS NOTES
Name: Aaron Jacobs      : 1960      MRN: [id number]  Encounter Provider: Anjel Roland DO  Encounter Date: 2023   Encounter department: FAMILY PRACTICE AT [address] [place] [address]     1. Cough, unspecified type  -     POCT Rapid Covid Ag  -     amoxicillin-clavulanate (AUGMENTIN) 875-125 mg per tablet; Take 1 tablet by mouth every 12 (twelve) hours for 7 days    2. Nasal congestion  -     amoxicillin-clavulanate (AUGMENTIN) 875-125 mg per tablet; Take 1 tablet by mouth every 12 (twelve) hours for 7 days           Subjective      Chief Complaint   Patient presents with   • Cough     Had a cold,cough for about a week, last night chest started hurting, hard to take a deep breath, coughing up mucus       Same day sick appt  States, "Couple of the grandkids were sick, got it from them, it's just not getting better"  Taking OTC Dextromethorphan, phenylephrine, guaifenesin product  "Sometimes chest hurts and feels like I'm not breathing properly"  I ask about any mucous/purulent sputum-  "what I have been able to bring up and get rid of, there has been a slight green tint to it, I have not noticed any color to the blowing my nose"   And pt sounds hoarse in room during HPI, states, "and throat's getting sore from coughing so much"  "Today is the fist day don't even want to eat"  "Was not aware that my temperature was up" 99 on rooming today   No chills or feverishness  Bp was also high on rooming- Recheck 142/89   Pt also brings out bag and shows what she was given from the lab here in our building to do the collection at home of the evening cortisol saliva swab tests that I had ordered; what she was given are sputum collection tubes and instructions on how to collect a morning sputum sample by coughing up mucous into the tube. Pt states the collection containers and instructions "didn't look right" to her, so she brought them in to show me.  I advise pt that she is correct, these don't appear to be the correct collection devices for saliva swab evening cortisol testing, and I advise her to go to the hospital lab and show the rx for the test (I reprinted the order for her today) and show them what she was given    Cough  This is a new problem. The current episode started in the past 7 days. The problem has been gradually worsening. The problem occurs every few minutes. The cough is Productive of sputum. Associated symptoms include chest pain, ear congestion, ear pain, headaches, myalgias, nasal congestion, postnasal drip, a sore throat and sweats. Pertinent negatives include no chills, fever, heartburn, hemoptysis, rash, rhinorrhea, shortness of breath, weight loss or wheezing. The symptoms are aggravated by cold air, exercise and lying down. Review of Systems   Constitutional:  Negative for chills, fever and weight loss. HENT:  Positive for ear pain, postnasal drip and sore throat. Negative for rhinorrhea. Respiratory:  Positive for cough. Negative for hemoptysis, shortness of breath and wheezing. Cardiovascular:  Positive for chest pain. Gastrointestinal:  Negative for heartburn. Musculoskeletal:  Positive for myalgias. Skin:  Negative for rash. Neurological:  Positive for headaches. Current Outpatient Medications on File Prior to Visit   Medication Sig   • ascorbic acid (VITAMIN C) 250 mg tablet Take 500 mg by mouth 2 (two) times a day   • bisoprolol-hydrochlorothiazide (ZIAC) 10-6.25 MG per tablet Take 1 tablet by mouth every morning   • Cholecalciferol (D3-1000 PO) Take 5,000 Int'l Units/day by mouth in the morning   • DULoxetine (CYMBALTA) 60 mg delayed release capsule    • escitalopram (LEXAPRO) 5 mg tablet Take 15 mg by mouth   • hydrochlorothiazide (HYDRODIURIL) 12.5 mg tablet take 1 tablet by mouth once daily if needed for SWELLING IN LEGS   • HYDROcodone-acetaminophen (NORCO) 5-325 mg per tablet TAKE 1-2 TABLETS BY MOUTH EVERY 12 (TWELVE) HOURS AS NEEDED FOR PAIN.  MAX DAILY AMOUNT: 4 TABLETS. • meclizine (ANTIVERT) 25 mg tablet Take 25 mg by mouth Three times daily as needed   • metFORMIN (GLUCOPHAGE-XR) 750 mg 24 hr tablet    • omeprazole (PriLOSEC) 20 mg delayed release capsule Take 1 capsule (20 mg total) by mouth daily   • Potassium 99 MG TABS Take by mouth   • Zinc Acetate, Oral, (ZINC ACETATE PO) Take by mouth   • DULoxetine HCl 60 MG CSDR  (Patient not taking: Reported on 11/28/2023)       Objective     /89 (BP Location: Left arm, Patient Position: Sitting, Cuff Size: Standard)   Pulse 66   Temp 99 °F (37.2 °C) (Tympanic)   Ht 5' 2.5" (1.588 m)   Wt 116 kg (255 lb 3.2 oz)   SpO2 96%   BMI 45.93 kg/m²     Physical Exam  Vitals and nursing note reviewed. Constitutional:       General: She is not in acute distress. Appearance: She is well-groomed. She is not ill-appearing, toxic-appearing or diaphoretic. HENT:      Head: Normocephalic and atraumatic. Right Ear: Tympanic membrane and ear canal normal.      Left Ear: Tympanic membrane and ear canal normal.      Nose: Mucosal edema and congestion present. Right Sinus: No maxillary sinus tenderness or frontal sinus tenderness. Left Sinus: No maxillary sinus tenderness or frontal sinus tenderness. Mouth/Throat:      Lips: Pink. Mouth: Mucous membranes are moist.      Pharynx: Uvula midline. No pharyngeal swelling, oropharyngeal exudate or uvula swelling. Tonsils: No tonsillar exudate. 0 on the right. 0 on the left. Comments: Injected post pharynx  Eyes:      General: Lids are normal.      Conjunctiva/sclera: Conjunctivae normal.   Neck:      Vascular: No JVD. Trachea: Trachea and phonation normal.   Cardiovascular:      Rate and Rhythm: Normal rate and regular rhythm. Heart sounds: S1 normal and S2 normal.   Pulmonary:      Effort: Pulmonary effort is normal.      Breath sounds: Normal breath sounds and air entry. Musculoskeletal:      Cervical back: Neck supple. Lymphadenopathy:      Cervical: Cervical adenopathy (shotty, tender) present. Skin:     General: Skin is warm and dry. Coloration: Skin is not pale. Neurological:      Mental Status: She is alert and oriented to person, place, and time. Gait: Gait normal.   Psychiatric:         Behavior: Behavior is cooperative.      Ramon Lee DO

## 2023-11-29 ENCOUNTER — APPOINTMENT (OUTPATIENT)
Dept: LAB | Facility: CLINIC | Age: 63
End: 2023-11-29

## 2023-11-29 DIAGNOSIS — M62.81 PROXIMAL MUSCLE WEAKNESS: ICD-10-CM

## 2023-11-29 DIAGNOSIS — I10 HYPERTENSION, BENIGN: ICD-10-CM

## 2023-11-29 DIAGNOSIS — E66.01 MORBID OBESITY (HCC): ICD-10-CM

## 2023-11-29 DIAGNOSIS — R25.1 TREMOR: ICD-10-CM

## 2023-11-29 DIAGNOSIS — R53.83 FATIGUE, UNSPECIFIED TYPE: ICD-10-CM

## 2023-11-29 DIAGNOSIS — Z86.39 HISTORY OF IRON DEFICIENCY: ICD-10-CM

## 2023-11-29 DIAGNOSIS — E65 CENTRAL OBESITY: ICD-10-CM

## 2023-11-30 ENCOUNTER — TELEPHONE (OUTPATIENT)
Dept: FAMILY MEDICINE CLINIC | Facility: CLINIC | Age: 63
End: 2023-11-30

## 2023-11-30 ENCOUNTER — APPOINTMENT (OUTPATIENT)
Dept: LAB | Facility: CLINIC | Age: 63
End: 2023-11-30
Payer: COMMERCIAL

## 2023-11-30 DIAGNOSIS — I10 HYPERTENSION, BENIGN: ICD-10-CM

## 2023-11-30 DIAGNOSIS — R25.1 TREMOR: ICD-10-CM

## 2023-11-30 DIAGNOSIS — E65 CENTRAL OBESITY: ICD-10-CM

## 2023-11-30 DIAGNOSIS — R53.83 FATIGUE, UNSPECIFIED TYPE: ICD-10-CM

## 2023-11-30 DIAGNOSIS — Z86.39 HISTORY OF IRON DEFICIENCY: ICD-10-CM

## 2023-11-30 DIAGNOSIS — M62.81 PROXIMAL MUSCLE WEAKNESS: ICD-10-CM

## 2023-11-30 DIAGNOSIS — E66.01 MORBID OBESITY (HCC): ICD-10-CM

## 2023-11-30 PROCEDURE — 82533 TOTAL CORTISOL: CPT

## 2023-11-30 NOTE — TELEPHONE ENCOUNTER
I did place an order 23 as below:     The lab employee cancelled that order today and entered an order for "salivary cortisol, one specimen", which is an active order and pt can obtain today    (It appears that the lab employee didn't see that the original order for 2 specimens were to be collected separately 2-3 weeks apart, not at one time, so she changed it to single specimen order)        [address] [facility] [address]  Phone: [phone]  Fax: [phone] Date: 2023   Name: Martin Ames  [address] [place] [address] [address] [place] [address]  [phone]    MRN: [id number]  : 1960  SEX: F     INS[employer]ANCE PA[employer] # SUBSCRIBER ID   [facility] [id number]       [id number]   [id number]      Secondary:                          SALIVARY CORTISOL, TWO SPECIMENS       (Order ID: [id number])     Diagnosis:  Central obesity (E65 [ICD-10-CM])  Proximal muscle weakness (M62.81 [ICD-10-CM])  Tremor (R25.1 [ICD-10-CM])  Morbid obesity ([address]) (E66.01 [ICD-10-CM])  Hypertension, benign (I10 [ICD-10-CM])  Fatigue, unspecified type (R53.83 [ICD-10-CM])  History of iron deficiency (Z86.39 [ICD-10-CM])  Priority:  Routine  Ordered by: Siva Rebolledo DO  Authorized by:  Siva Rebolledo DO   (NPI: [id number])

## 2023-11-30 NOTE — TELEPHONE ENCOUNTER
Pt called stating that she is unable to drop off the saliva test for her cortisol levels. Pt states that the lab is unable to find the script for it in the system. I am also unable to locate it. Pt I wondering if a script can be printed out for her to . Pt needs to do today because her insurance is going to change tomorrow and then she will have to pay out of pocket for it.

## 2023-12-01 DIAGNOSIS — M62.81 PROXIMAL MUSCLE WEAKNESS: ICD-10-CM

## 2023-12-01 DIAGNOSIS — Z86.39 HISTORY OF IRON DEFICIENCY: ICD-10-CM

## 2023-12-01 DIAGNOSIS — E65 CENTRAL OBESITY: Primary | ICD-10-CM

## 2023-12-01 DIAGNOSIS — R53.83 FATIGUE, UNSPECIFIED TYPE: ICD-10-CM

## 2023-12-01 DIAGNOSIS — I10 HYPERTENSION, BENIGN: ICD-10-CM

## 2023-12-01 DIAGNOSIS — E66.01 MORBID OBESITY (HCC): ICD-10-CM

## 2023-12-01 DIAGNOSIS — R25.1 TREMOR: ICD-10-CM

## 2023-12-01 NOTE — TELEPHONE ENCOUNTER
Spoke to pt, pt saw the order in her MyChart later in the day yesterday and she obtained that lab. Pt also stated if Jose Guadalupe Elliott would like her to complete another saliva test in 2-3 weeks you will have to put another order in because they deleted it.

## 2023-12-04 LAB — CORTIS SAL-MCNC: <0.01 UG/DL

## 2023-12-13 ENCOUNTER — PATIENT MESSAGE (OUTPATIENT)
Dept: FAMILY MEDICINE CLINIC | Facility: CLINIC | Age: 63
End: 2023-12-13

## 2023-12-14 ENCOUNTER — TELEPHONE (OUTPATIENT)
Dept: FAMILY MEDICINE CLINIC | Facility: CLINIC | Age: 63
End: 2023-12-14

## 2023-12-14 ENCOUNTER — APPOINTMENT (OUTPATIENT)
Dept: LAB | Facility: CLINIC | Age: 63
End: 2023-12-14
Payer: MEDICARE

## 2023-12-14 DIAGNOSIS — E66.01 MORBID OBESITY (HCC): ICD-10-CM

## 2023-12-14 DIAGNOSIS — M62.81 PROXIMAL MUSCLE WEAKNESS: ICD-10-CM

## 2023-12-14 DIAGNOSIS — R25.1 TREMOR: Primary | ICD-10-CM

## 2023-12-14 DIAGNOSIS — I10 HYPERTENSION, BENIGN: ICD-10-CM

## 2023-12-14 DIAGNOSIS — R25.1 TREMOR: ICD-10-CM

## 2023-12-14 DIAGNOSIS — R53.83 FATIGUE, UNSPECIFIED TYPE: ICD-10-CM

## 2023-12-14 DIAGNOSIS — E65 CENTRAL OBESITY: ICD-10-CM

## 2023-12-14 DIAGNOSIS — R42 VERTIGO: ICD-10-CM

## 2023-12-14 DIAGNOSIS — Z86.39 HISTORY OF IRON DEFICIENCY: ICD-10-CM

## 2023-12-14 PROCEDURE — 82533 TOTAL CORTISOL: CPT

## 2023-12-14 NOTE — TELEPHONE ENCOUNTER
Please let pt know that I can put in an order for [facility] neurology to switch, though, since she is a new pt, it may take a few months for an appt          All Conversations: Neurology  (Newest Message First)  December 13, 2023  Justin Fry   to Me   [facility]    12/13/23 12:46 PM  Please see pts note below regarding neurology. Thanks. Bear Hernandez   to [facility] (supporting You)   MB      12/13/23 11:45 AM  I saw Dr. Abraham De Anda, of [facility], this morning. I had made this appointment, due to the Vertigo I had, before I saw you. Anyway, he agrees I need a new neurologist and referred me to [facility]. Since that is where my previous neurologist is located, I am not sure they will allow me to see someone new. I've tried before to get away from this doctor. You and I had also discussed sending me to neurology due to the shaking of my head. Dr. Abraham De Anda also ordered an MRI with contrast. He is checking for a variety of things, including whether my balance issues are inner ear or not. I was thinking that, since Olga Maradiaga ordered the MRI, he is with [facility] and I have a follow up appointment with him, I would go ahead and schedule the MRI with [facility]. If you want me to try [facility] first, I will.  However, I would prefer not to argue with them and have you go ahead and refer me to a neurologist you trust.      Thank you for your time,  Hakeem Jay

## 2023-12-14 NOTE — PATIENT COMMUNICATION
Patient called into the office to request valium for her Brain MRI tonight. After speaking with CM, she stated that she should contact their office to prescribe anything as they are the one's ordering the MRI. I informed the patient to give us a phone call back if there are any issues.

## 2023-12-19 LAB — CORTIS SAL-MCNC: <0.01 UG/DL

## 2023-12-20 ENCOUNTER — TELEPHONE (OUTPATIENT)
Dept: FAMILY MEDICINE CLINIC | Facility: CLINIC | Age: 63
End: 2023-12-20

## 2023-12-21 ENCOUNTER — TELEPHONE (OUTPATIENT)
Dept: FAMILY MEDICINE CLINIC | Facility: CLINIC | Age: 63
End: 2023-12-21

## 2023-12-21 ENCOUNTER — OFFICE VISIT (OUTPATIENT)
Dept: FAMILY MEDICINE CLINIC | Facility: CLINIC | Age: 63
End: 2023-12-21
Payer: MEDICARE

## 2023-12-21 VITALS
DIASTOLIC BLOOD PRESSURE: 82 MMHG | TEMPERATURE: 97.6 F | SYSTOLIC BLOOD PRESSURE: 126 MMHG | OXYGEN SATURATION: 99 % | WEIGHT: 255.6 LBS | HEIGHT: 63 IN | BODY MASS INDEX: 45.29 KG/M2 | HEART RATE: 70 BPM

## 2023-12-21 DIAGNOSIS — E88.810 DYSMETABOLIC SYNDROME X: ICD-10-CM

## 2023-12-21 DIAGNOSIS — E65 CENTRAL OBESITY: ICD-10-CM

## 2023-12-21 DIAGNOSIS — R68.89 CUSHINGOID FACIES: ICD-10-CM

## 2023-12-21 DIAGNOSIS — E11.9 TYPE 2 DIABETES MELLITUS WITHOUT COMPLICATION, WITHOUT LONG-TERM CURRENT USE OF INSULIN (HCC): ICD-10-CM

## 2023-12-21 DIAGNOSIS — Z00.00 WELCOME TO MEDICARE PREVENTIVE VISIT: Primary | ICD-10-CM

## 2023-12-21 DIAGNOSIS — M54.16 LUMBAR RADICULOPATHY: ICD-10-CM

## 2023-12-21 DIAGNOSIS — M48.061 SPINAL STENOSIS OF LUMBAR REGION, UNSPECIFIED WHETHER NEUROGENIC CLAUDICATION PRESENT: ICD-10-CM

## 2023-12-21 PROBLEM — M50.10 HERNIATION OF CERVICAL INTERVERTEBRAL DISC WITH RADICULOPATHY: Status: ACTIVE | Noted: 2019-09-06

## 2023-12-21 PROBLEM — M47.817 LUMBOSACRAL SPONDYLOSIS WITHOUT MYELOPATHY: Status: ACTIVE | Noted: 2019-09-20

## 2023-12-21 PROBLEM — M46.86 OTHER SPECIFIED INFLAMMATORY SPONDYLOPATHIES, LUMBAR REGION (HCC): Status: ACTIVE | Noted: 2023-04-03

## 2023-12-21 PROCEDURE — G0403 EKG FOR INITIAL PREVENT EXAM: HCPCS | Performed by: FAMILY MEDICINE

## 2023-12-21 PROCEDURE — 99214 OFFICE O/P EST MOD 30 MIN: CPT | Performed by: FAMILY MEDICINE

## 2023-12-21 PROCEDURE — G0402 INITIAL PREVENTIVE EXAM: HCPCS | Performed by: FAMILY MEDICINE

## 2023-12-21 PROCEDURE — 99173 VISUAL ACUITY SCREEN: CPT | Performed by: FAMILY MEDICINE

## 2023-12-21 NOTE — PROGRESS NOTES
"Name: Hanna Hernandez      : 1960      MRN: [id number]  Encounter Provider: Micki Pool DO  Encounter Date: 2023   Encounter department: FAMILY PRACTICE AT [place]    Assessment & Plan     1. Welcome to Medicare preventive visit  -     POCT ECG    2. Type 2 diabetes mellitus without complication, without long-term current use of insulin (HCC)  -     Ambulatory Referral to Endocrinology; Future    3. Central obesity  -     Ambulatory Referral to Endocrinology; Future    4. Dysmetabolic syndrome X  -     Ambulatory Referral to Endocrinology; Future    5. Cushingoid facies  -     Ambulatory Referral to Endocrinology; Future    6. Lumbar radiculopathy    Despite normal dexamethasone suppression test and 2 normal evening cortisol oral swab results, I still have high level of suspicion for Cushings dz in this pt; I am requesting endo eval/treat    Depression Screening and Follow-up Plan: Patient was screened for depression during today's encounter. They screened negative with a PHQ-2 score of 1.      Chief Complaint   Patient presents with    Results     Discuss cortisol level    Fatigue     weakness    Urinary Frequency     Pain in pelvis but doesn't have UTI       Subjective      Here for scheduled visit to discuss test results  Pt voices her frustration that cortisol testing has been normal; she had hoped that there was an answer for her symptomatology (we had discussed suspected Cushings syndrome etiol)  C/o \"Everything hurts - my muscles, my joints, I'm getting side-stickers - having the symptoms of a UTI, but don't have a fever and not getting blood (in the urine) - don't get them that often, but usually have fever with it and bleeding like a period\" - just pain perineal area and frequency  \"My sugar numbers have gotten worse on the metformin - I was even on ozempic for a time and my sugar numbers went up- medicines seem to work backward\"  \"Do have an appt with the new GI doctor that Dr." "Consuelo recommended\" ([facility])  Also, I see that pt is now on Medicare- admits she is new as of December 1st - she is willing to do Welcome to Medicare visit today as well        Review of Systems    Current Outpatient Medications on File Prior to Visit   Medication Sig    ascorbic acid (VITAMIN C) 250 mg tablet Take 500 mg by mouth 2 (two) times a day    bisoprolol-hydrochlorothiazide (ZIAC) 10-6.25 MG per tablet Take 1 tablet by mouth every morning    Cholecalciferol (D3-1000 PO) Take 5,000 Int'l Units/day by mouth in the morning    DULoxetine (CYMBALTA) 60 mg delayed release capsule     escitalopram (LEXAPRO) 5 mg tablet Take 15 mg by mouth    HYDROcodone-acetaminophen (NORCO) 5-325 mg per tablet TAKE 1-2 TABLETS BY MOUTH EVERY 12 (TWELVE) HOURS AS NEEDED FOR PAIN. MAX DAILY AMOUNT: 4 TABLETS.    metFORMIN (GLUCOPHAGE-XR) 750 mg 24 hr tablet     omeprazole (PriLOSEC) 20 mg delayed release capsule Take 1 capsule (20 mg total) by mouth daily    Zinc Acetate, Oral, (ZINC ACETATE PO) Take by mouth    [DISCONTINUED] hydrochlorothiazide (HYDRODIURIL) 12.5 mg tablet take 1 tablet by mouth once daily if needed for SWELLING IN LEGS    DULoxetine HCl 60 MG CSDR  (Patient not taking: Reported on 11/28/2023)       Objective     /82 (BP Location: Right arm, Patient Position: Sitting, Cuff Size: Standard)   Pulse 70   Temp 97.6 °F (36.4 °C) (Tympanic)   Ht 5' 2.5\" (1.588 m)   Wt 116 kg (255 lb 9.6 oz)   SpO2 99%   BMI 46.00 kg/m²     Physical Exam  Micki Pool DO    "

## 2023-12-21 NOTE — PATIENT INSTRUCTIONS
Medicare Preventive Visit Patient Instructions  Thank you for completing your Welcome to Medicare Visit or Medicare Annual Wellness Visit today. Your next wellness visit will be due in one year (12/21/2024).  The screening/preventive services that you may require over the next 5-10 years are detailed below. Some tests may not apply to you based off risk factors and/or age. Screening tests ordered at today's visit but not completed yet may show as past due. Also, please note that scanned in results may not display below.  Preventive Screenings:  Service Recommendations Previous Testing/Comments   Colorectal Cancer Screening  * Colonoscopy    * Fecal Occult Blood Test (FOBT)/Fecal Immunochemical Test (FIT)  * Fecal DNA/Cologuard Test  * Flexible Sigmoidoscopy Age: 45-75 years old   Colonoscopy: every 10 years (may be performed more frequently if at higher risk)  OR  FOBT/FIT: every 1 year  OR  Cologuard: every 3 years  OR  Sigmoidoscopy: every 5 years  Screening may be recommended earlier than age 45 if at higher risk for colorectal cancer. Also, an individualized decision between you and your healthcare provider will decide whether screening between the ages of 76-85 would be appropriate. Colonoscopy: 02/04/2020  FOBT/FIT: Not on file  Cologuard: Not on file  Sigmoidoscopy: Not on file    Screening Current     Breast Cancer Screening Age: 40+ years old  Frequency: every 1-2 years  Not required if history of left and right mastectomy Mammogram: 03/28/2023    Screening Current   Cervical Cancer Screening Between the ages of 21-29, pap smear recommended once every 3 years.   Between the ages of 30-65, can perform pap smear with HPV co-testing every 5 years.   Recommendations may differ for women with a history of total hysterectomy, cervical cancer, or abnormal pap smears in past. Pap Smear: Not on file        Hepatitis C Screening Once for adults born between 1945 and 1965  More frequently in patients at high risk for  Hepatitis C Hep C Antibody: Not on file        Diabetes Screening 1-2 times per year if you're at risk for diabetes or have pre-diabetes Fasting glucose: No results in last 5 years (No results in last 5 years)  A1C: 6.2 (11/9/2023)  Screening Not Indicated  History Diabetes   Cholesterol Screening Once every 5 years if you don't have a lipid disorder. May order more often based on risk factors. Lipid panel: 04/04/2023    Screening Current     Other Preventive Screenings Covered by Medicare:  Abdominal Aortic Aneurysm (AAA) Screening: covered once if your at risk. You're considered to be at risk if you have a family history of AAA.  Lung Cancer Screening: covers low dose CT scan once per year if you meet all of the following conditions: (1) Age 55-77; (2) No signs or symptoms of lung cancer; (3) Current smoker or have quit smoking within the last 15 years; (4) You have a tobacco smoking history of at least 20 pack years (packs per day multiplied by number of years you smoked); (5) You get a written order from a healthcare provider.  Glaucoma Screening: covered annually if you're considered high risk: (1) You have diabetes OR (2) Family history of glaucoma OR (3)  aged 50 and older OR (4)  American aged 65 and older  Osteoporosis Screening: covered every 2 years if you meet one of the following conditions: (1) You're estrogen deficient and at risk for osteoporosis based off medical history and other findings; (2) Have a vertebral abnormality; (3) On glucocorticoid therapy for more than 3 months; (4) Have primary hyperparathyroidism; (5) On osteoporosis medications and need to assess response to drug therapy.   Last bone density test (DXA Scan): Not on file.  HIV Screening: covered annually if you're between the age of 15-65. Also covered annually if you are younger than 15 and older than 65 with risk factors for HIV infection. For pregnant patients, it is covered up to 3 times per  pregnancy.    Immunizations:  Immunization Recommendations   Influenza Vaccine Annual influenza vaccination during flu season is recommended for all persons aged >= 6 months who do not have contraindications   Pneumococcal Vaccine   * Pneumococcal conjugate vaccine = PCV13 (Prevnar 13), PCV15 (Vaxneuvance), PCV20 (Prevnar 20)  * Pneumococcal polysaccharide vaccine = PPSV23 (Pneumovax) Adults 19-63 yo with certain risk factors or if 65+ yo  If never received any pneumonia vaccine: recommend Prevnar 20 (PCV20)  Give PCV20 if previously received 1 dose of PCV13 or PPSV23   Hepatitis B Vaccine 3 dose series if at intermediate or high risk (ex: diabetes, end stage renal disease, liver disease)   Respiratory syncytial virus (RSV) Vaccine - COVERED BY MEDICARE PART D  * RSVPreF3 (Arexvy) CDC recommends that adults 60 years of age and older may receive a single dose of RSV vaccine using shared clinical decision-making (SCDM)   Tetanus (Td) Vaccine - COST NOT COVERED BY MEDICARE PART B Following completion of primary series, a booster dose should be given every 10 years to maintain immunity against tetanus. Td may also be given as tetanus wound prophylaxis.   Tdap Vaccine - COST NOT COVERED BY MEDICARE PART B Recommended at least once for all adults. For pregnant patients, recommended with each pregnancy.   Shingles Vaccine (Shingrix) - COST NOT COVERED BY MEDICARE PART B  2 shot series recommended in those 19 years and older who have or will have weakened immune systems or those 50 years and older     Health Maintenance Due:      Topic Date Due   • Hepatitis C Screening  Never done   • HIV Screening  Never done   • Colorectal Cancer Screening  Never done   • Breast Cancer Screening: Mammogram  03/28/2024     Immunizations Due:      Topic Date Due   • COVID-19 Vaccine (1) Never done   • Pneumococcal Vaccine: Pediatrics (0 to 5 Years) and At-Risk Patients (6 to 64 Years) (1 - PCV) Never done   • Influenza Vaccine (1)  09/01/2023     Advance Directives   What are advance directives?  Advance directives are legal documents that state your wishes and plans for medical care. These plans are made ahead of time in case you lose your ability to make decisions for yourself. Advance directives can apply to any medical decision, such as the treatments you want, and if you want to donate organs.   What are the types of advance directives?  There are many types of advance directives, and each state has rules about how to use them. You may choose a combination of any of the following:  Living will:  This is a written record of the treatment you want. You can also choose which treatments you do not want, which to limit, and which to stop at a certain time. This includes surgery, medicine, IV fluid, and tube feedings.   Durable power of  for healthcare (DPAHC):  This is a written record that states who you want to make healthcare choices for you when you are unable to make them for yourself. This person, called a proxy, is usually a family member or a friend. You may choose more than 1 proxy.  Do not resuscitate (DNR) order:  A DNR order is used in case your heart stops beating or you stop breathing. It is a request not to have certain forms of treatment, such as CPR. A DNR order may be included in other types of advance directives.  Medical directive:  This covers the care that you want if you are in a coma, near death, or unable to make decisions for yourself. You can list the treatments you want for each condition. Treatment may include pain medicine, surgery, blood transfusions, dialysis, IV or tube feedings, and a ventilator (breathing machine).  Values history:  This document has questions about your views, beliefs, and how you feel and think about life. This information can help others choose the care that you would choose.  Why are advance directives important?  An advance directive helps you control your care. Although spoken  wishes may be used, it is better to have your wishes written down. Spoken wishes can be misunderstood, or not followed. Treatments may be given even if you do not want them. An advance directive may make it easier for your family to make difficult choices about your care.   Urinary Incontinence   Urinary incontinence (UI)  is when you lose control of your bladder. UI develops because your bladder cannot store or empty urine properly. The 3 most common types of UI are stress incontinence, urge incontinence, or both.  Medicines:   May be given to help strengthen your bladder control. Report any side effects of medication to your healthcare provider.  Do pelvic muscle exercises often:  Your pelvic muscles help you stop urinating. Squeeze these muscles tight for 5 seconds, then relax for 5 seconds. Gradually work up to squeezing for 10 seconds. Do 3 sets of 15 repetitions a day, or as directed. This will help strengthen your pelvic muscles and improve bladder control.  Train your bladder:  Go to the bathroom at set times, such as every 2 hours, even if you do not feel the urge to go. You can also try to hold your urine when you feel the urge to go. For example, hold your urine for 5 minutes when you feel the urge to go. As that becomes easier, hold your urine for 10 minutes.   Self-care:   Keep a UI record.  Write down how often you leak urine and how much you leak. Make a note of what you were doing when you leaked urine.  Drink liquids as directed. You may need to limit the amount of liquid you drink to help control your urine leakage. Do not drink any liquid right before you go to bed. Limit or do not have drinks that contain caffeine or alcohol.   Prevent constipation.  Eat a variety of high-fiber foods. Good examples are high-fiber cereals, beans, vegetables, and whole-grain breads. Walking is the best way to trigger your intestines to have a bowel movement.  Exercise regularly and maintain a healthy weight.   Weight loss and exercise will decrease pressure on your bladder and help you control your leakage.   Use a catheter as directed  to help empty your bladder. A catheter is a tiny, plastic tube that is put into your bladder to drain your urine.   Go to behavior therapy as directed.  Behavior therapy may be used to help you learn to control your urge to urinate.    Weight Management   Why it is important to manage your weight:  Being overweight increases your risk of health conditions such as heart disease, high blood pressure, type 2 diabetes, and certain types of cancer. It can also increase your risk for osteoarthritis, sleep apnea, and other respiratory problems. Aim for a slow, steady weight loss. Even a small amount of weight loss can lower your risk of health problems.  How to lose weight safely:  A safe and healthy way to lose weight is to eat fewer calories and get regular exercise. You can lose up about 1 pound a week by decreasing the number of calories you eat by 500 calories each day.   Healthy meal plan for weight management:  A healthy meal plan includes a variety of foods, contains fewer calories, and helps you stay healthy. A healthy meal plan includes the following:  Eat whole-grain foods more often.  A healthy meal plan should contain fiber. Fiber is the part of grains, fruits, and vegetables that is not broken down by your body. Whole-grain foods are healthy and provide extra fiber in your diet. Some examples of whole-grain foods are whole-wheat breads and pastas, oatmeal, brown rice, and bulgur.  Eat a variety of vegetables every day.  Include dark, leafy greens such as spinach, kale, deanna greens, and mustard greens. Eat yellow and orange vegetables such as carrots, sweet potatoes, and winter squash.   Eat a variety of fruits every day.  Choose fresh or canned fruit (canned in its own juice or light syrup) instead of juice. Fruit juice has very little or no fiber.  Eat low-fat dairy foods.   Drink fat-free (skim) milk or 1% milk. Eat fat-free yogurt and low-fat cottage cheese. Try low-fat cheeses such as mozzarella and other reduced-fat cheeses.  Choose meat and other protein foods that are low in fat.  Choose beans or other legumes such as split peas or lentils. Choose fish, skinless poultry (chicken or turkey), or lean cuts of red meat (beef or pork). Before you cook meat or poultry, cut off any visible fat.   Use less fat and oil.  Try baking foods instead of frying them. Add less fat, such as margarine, sour cream, regular salad dressing and mayonnaise to foods. Eat fewer high-fat foods. Some examples of high-fat foods include french fries, doughnuts, ice cream, and cakes.  Eat fewer sweets.  Limit foods and drinks that are high in sugar. This includes candy, cookies, regular soda, and sweetened drinks.  Exercise:  Exercise at least 30 minutes per day on most days of the week. Some examples of exercise include walking, biking, dancing, and swimming. You can also fit in more physical activity by taking the stairs instead of the elevator or parking farther away from stores. Ask your healthcare provider about the best exercise plan for you.   Narcotic (Opioid) Safety    Use narcotics safely:  Take prescribed narcotics exactly as directed  Do not give narcotics to others or take narcotics that belong to someone else  Do not mix narcotics without medicines or alcohol  Do not drive or operate heavy machinery after you take the narcotic  Monitor for side effects and notify your healthcare provider if you experienced side effects such as nausea, sleepiness, itching, or trouble thinking clearly.    Manage constipation:    Constipation is the most common side effect of narcotic medicine. Constipation is when you have hard, dry bowel movements, or you go longer than usual between bowel movements. Tell your healthcare provider about all changes in your bowel movements while you are taking narcotics. He or she  may recommend laxative medicine to help you have a bowel movement. He or she may also change the kind of narcotic you are taking, or change when you take it. The following are more ways you can prevent or relieve constipation:    Drink liquids as directed.  You may need to drink extra liquids to help soften and move your bowels. Ask how much liquid to drink each day and which liquids are best for you.  Eat high-fiber foods.  This may help decrease constipation by adding bulk to your bowel movements. High-fiber foods include fruits, vegetables, whole-grain breads and cereals, and beans. Your healthcare provider or dietitian can help you create a high-fiber meal plan. Your provider may also recommend a fiber supplement if you cannot get enough fiber from food.  Exercise regularly.  Regular physical activity can help stimulate your intestines. Walking is a good exercise to prevent or relieve constipation. Ask which exercises are best for you.  Schedule a time each day to have a bowel movement.  This may help train your body to have regular bowel movements. Bend forward while you are on the toilet to help move the bowel movement out. Sit on the toilet for at least 10 minutes, even if you do not have a bowel movement.    Store narcotics safely:   Store narcotics where others cannot easily get them.  Keep them in a locked cabinet or secure area. Do not  keep them in a purse or other bag you carry with you. A person may be looking for something else and find the narcotics.  Make sure narcotics are stored out of the reach of children.  A child can easily overdose on narcotics. Narcotics may look like candy to a small child.    The best way to dispose of narcotics:      The laws vary by country and area. In the [place], the best way is to return the narcotics through a take-back program. This program is offered by the [place] Drug Enforcement Agency ([employer]). The following are options for using the program:  Take the narcotics  to a [employer] collection site.  The site is often a law enforcement center. Call your local law enforcement center for scheduled take-back days in your area. You will be given information on where to go if the collection site is in a different location.  Take the narcotics to an approved pharmacy or hospital.  A pharmacy or hospital may be set up as a collection site. You will need to ask if it is a [employer] collection site if you were not directed there. A pharmacy or doctor's office may not be able to take back narcotics unless it is a [employer] site.  Use a mail-back system.  This means you are given containers to put the narcotics into. You will then mail them in the containers.  Use a take-back drop box.  This is a place to leave the narcotics at any time. People and animals will not be able to get into the box. Your local law enforcement agency can tell you where to find a drop box in your area.    Other ways to manage pain:   Ask your healthcare provider about non-narcotic medicines to control pain.  Nonprescription medicines include NSAIDs (such as ibuprofen) and acetaminophen. Prescription medicines include muscle relaxers, antidepressants, and steroids.  Pain may be managed without any medicines.  Some ways to relieve pain include massage, aromatherapy, or meditation. Physical or occupational therapy may also help.    For more information:   Drug Enforcement Administration  [address] [place] [address]  Phone: 3- 085 - 513-1235  Web Address: https://www.deadiversion.[facility].gov/drug_disposal/    [place] Food and Drug Administration  [address] [place] [address]  Phone: 4- 380 - 579-5778  Web Address: http://www.fda.gov     © Copyright [employer] 2018 Information is for End User's use only and may not be sold, redistributed or otherwise used for commercial purposes. All illustrations and images included in CareNotes® are the copyrighted property of [employer] or [employer]

## 2023-12-21 NOTE — TELEPHONE ENCOUNTER
Please let pt know that on comparing the EKG we did today at her Welcome to Medicare visit to ones that were done at an ER visit in October this year, she continues to have an abnormality present for which I would want her to see cardiologist.   I see on chart review that she had seen cardiologist at [facility] a few years ago- does she want to return there?

## 2023-12-21 NOTE — PROGRESS NOTES
Assessment and Plan:     Problem List Items Addressed This Visit          Endocrine    Type 2 diabetes mellitus without complication (HCC)    Relevant Orders    Ambulatory Referral to Endocrinology       Nervous and Auditory    Lumbar radiculopathy       Other    Dysmetabolic syndrome X    Relevant Orders    Ambulatory Referral to Endocrinology    Cushingoid facies    Relevant Orders    Ambulatory Referral to Endocrinology    Central obesity    Relevant Orders    Ambulatory Referral to Endocrinology     Other Visit Diagnoses       Welcome to Medicare preventive visit    -  Primary            Depression Screening and Follow-up Plan: Patient was screened for depression during today's encounter. They screened negative with a PHQ-2 score of 1.    Preventive health issues were discussed with patient, and age appropriate screening tests were ordered as noted in patient's After Visit Summary.  Personalized health advice and appropriate referrals for health education or preventive services given if needed, as noted in patient's After Visit Summary.     History of Present Illness:     Patient presents for a Medicare Wellness Visit    Welcome to Medicare visit    Fatigue  Associated symptoms include fatigue.   Urinary Frequency   Associated symptoms include frequency.      Patient Care Team:  Micki Pool DO as PCP - General (Family Medicine)  Mya Burch PA-C (Physician Assistant)     Review of Systems:     Review of Systems   Constitutional:  Positive for fatigue.   Genitourinary:  Positive for frequency.        Problem List:     Patient Active Problem List   Diagnosis   • Anxiety   • Hypertension, benign   • Morbid obesity (HCC)   • GERD without esophagitis   • Family hx of colon cancer   • SIXTO on CPAP   • PONV (postoperative nausea and vomiting)   • Back pain   • Slow transit constipation   • Type 2 diabetes mellitus without complication (HCC)   • Depression due to physical illness   • Continuous opioid  dependence (HCC)   • Central obesity   • Tremor   • Chronic pain syndrome   • Diarrhea   • Vertigo   • Other specified inflammatory spondylopathies, lumbar region (HCC)   • Dysmetabolic syndrome X   • Herniation of cervical intervertebral disc with radiculopathy   • Lumbar foraminal stenosis   • Lumbosacral spondylosis without myelopathy   • Lumbar radiculopathy   • Cushingoid facies      Past Medical and Surgical History:     Past Medical History:   Diagnosis Date   • Anemia    • Anusitis     history of   • Anxiety    • CAD (coronary artery disease) 2008   • Colon polyp    • Deep vein thrombosis (HCC)    • Depression    • Diabetes mellitus (HCC)    • Diverticulosis 2006   • Fatty liver    • Fibrocystic breast    • Fibromyalgia    • Fibromyalgia, primary    • GERD (gastroesophageal reflux disease)    • H. pylori infection 12/1999    treated with Prilosec and Biaxin,  Recurrent H. pylori stool antigen positive 06/03 treated with Aciphex, amoxicillin and Biaxin unsuccessfully with recurrent positivity 12/03, treated Prilosec and Biaxin 01/04.  Positive breath test , 05/04 with Aciphex therapy used for 2 weeks 08/04.   • History of DVT (deep vein thrombosis)     following knee replacement   • History of panic attacks    • Hyperlipidemia    • Hypertension    • Obesity    • PONV (postoperative nausea and vomiting)    • Thyroid nodule      Past Surgical History:   Procedure Laterality Date   • BREAST SURGERY     • CARDIAC SURGERY      cardiac cath   • CARPAL TUNNEL RELEASE Right 03/2013   • CHOLECYSTECTOMY  1984   • COLONOSCOPY  2006    Diverticulosis   • COLONOSCOPY  2011    2 hyperplastic polyps removed   • COLONOSCOPY     • COMBINED REDUCTION MAMMAPLASTY W/ ABDOMINOPLASTY  2005   • FOOT SURGERY     • HYSTERECTOMY      for prolapsed uterus with retention of ovaries   • JOINT REPLACEMENT Left     TKR and revision   • REPLACEMENT TOTAL KNEE      followed by DVT;  ANTIBIOTICS TO BE USED INDEFINITELY BEFORE DENTAL  PROCEDURES.   • US GUIDED THYROID BIOPSY  11/15/2017   • WRIST SURGERY Right     x5      Family History:     Family History   Problem Relation Age of Onset   • Obesity Mother    • Colon cancer Mother    • Cancer Mother         colon cancer   • Obesity Father    • Heart disease Father    • Diabetes Father    • Depression Father    • Hypertension Father    • OCD Father    • Obesity Sister    • Diverticulitis Sister    • Obesity Sister    • Diverticulitis Sister    • Other Sister         perforated colon   • Colon polyps Sister    • Obesity Brother    • Colon polyps Brother    • Colon polyps Maternal Grandmother    • Stroke Maternal Grandfather    • Obesity Paternal Grandmother    • Stroke Paternal Grandmother    • Heart disease Paternal Grandmother    • Obesity Paternal Grandfather    • Stroke Paternal Grandfather    • Heart disease Paternal Grandfather    • Colon cancer Maternal Uncle       Social History:     Social History     Socioeconomic History   • Marital status: /Civil Union     Spouse name: None   • Number of children: None   • Years of education: None   • Highest education level: None   Occupational History   • Occupation: Disabled   Tobacco Use   • Smoking status: Former     Current packs/day: 0.00     Average packs/day: 1.5 packs/day for 5.0 years (7.5 ttl pk-yrs)     Types: Cigarettes     Start date: 1981     Quit date: 1986     Years since quittin.7   • Smokeless tobacco: Never   • Tobacco comments:     Quit at age 25   Vaping Use   • Vaping status: Never Used   Substance and Sexual Activity   • Alcohol use: Not Currently   • Drug use: Never   • Sexual activity: Yes     Partners: Male     Birth control/protection: Surgical   Other Topics Concern   • None   Social History Narrative    , 3 children, 8 grandchildren     Social Determinants of Health     Financial Resource Strain: Medium Risk (2023)    Overall Financial Resource Strain (CARDIA)    • Difficulty of Paying  Living Expenses: Somewhat hard   Food Insecurity: Not on file   Transportation Needs: No Transportation Needs (12/21/2023)    PRAPARE - Transportation    • Lack of Transportation (Medical): No    • Lack of Transportation (Non-Medical): No   Physical Activity: Not on file   Stress: Not on file   Social Connections: Not on file   Intimate Partner Violence: Not on file   Housing Stability: Not on file      Medications and Allergies:     Current Outpatient Medications   Medication Sig Dispense Refill   • ascorbic acid (VITAMIN C) 250 mg tablet Take 500 mg by mouth 2 (two) times a day     • bisoprolol-hydrochlorothiazide (ZIAC) 10-6.25 MG per tablet Take 1 tablet by mouth every morning     • Cholecalciferol (D3-1000 PO) Take 5,000 Int'l Units/day by mouth in the morning     • DULoxetine (CYMBALTA) 60 mg delayed release capsule      • escitalopram (LEXAPRO) 5 mg tablet Take 15 mg by mouth     • hydrochlorothiazide (HYDRODIURIL) 12.5 mg tablet take 1 tablet by mouth once daily if needed for SWELLING IN LEGS     • HYDROcodone-acetaminophen (NORCO) 5-325 mg per tablet TAKE 1-2 TABLETS BY MOUTH EVERY 12 (TWELVE) HOURS AS NEEDED FOR PAIN. MAX DAILY AMOUNT: 4 TABLETS.     • metFORMIN (GLUCOPHAGE-XR) 750 mg 24 hr tablet      • omeprazole (PriLOSEC) 20 mg delayed release capsule Take 1 capsule (20 mg total) by mouth daily 30 capsule 3   • Zinc Acetate, Oral, (ZINC ACETATE PO) Take by mouth     • DULoxetine HCl 60 MG CSDR  (Patient not taking: Reported on 11/28/2023)       No current facility-administered medications for this visit.     Allergies   Allergen Reactions   • Codeine Other (See Comments)     ? stopped breathing, janine morphine 10/05     • Iv Contrast [Iodinated Contrast Media] Shortness Of Breath   • Other Shortness Of Breath   • Ampicillin GI Intolerance     Dizzy, passed out     • Cimetidine Other (See Comments)     Pain in stomach     • Clam Shell - Food Allergy Throat Swelling   • Fentanyl GI Intolerance      janine  Dilaudid      Only patch     • Morphine GI Intolerance   • Oxycodone-Acetaminophen Other (See Comments)     Hallucinations from Percocet     • Tetracycline Rash      Immunizations:     Immunization History   Administered Date(s) Administered   • INFLUENZA 12/23/2008, 10/13/2011   • Tdap 01/19/2010, 09/28/2017      Health Maintenance:         Topic Date Due   • Hepatitis C Screening  Never done   • HIV Screening  Never done   • Colorectal Cancer Screening  Never done   • Breast Cancer Screening: Mammogram  03/28/2024         Topic Date Due   • COVID-19 Vaccine (1) Never done   • Pneumococcal Vaccine: Pediatrics (0 to 5 Years) and At-Risk Patients (6 to 64 Years) (1 - PCV) Never done   • Influenza Vaccine (1) 09/01/2023      Medicare Screening Tests and Risk Assessments:     Hanna is here for her Welcome to Medicare visit.     Health Risk Assessment:   Patient rates overall health as fair. Patient feels that their physical health rating is much worse. Patient is satisfied with their life. Eyesight was rated as same. Hearing was rated as same. Patient feels that their emotional and mental health rating is slightly worse. Patients states they are never, rarely angry. Patient states they are always unusually tired/fatigued. Pain experienced in the last 7 days has been a lot. Patient's pain rating has been 6/10. Patient states that she has experienced no weight loss or gain in last 6 months.     Depression Screening:   PHQ-2 Score: 1      Fall Risk Screening:   In the past year, patient has experienced: history of falling in past year    Number of falls: 2 or more  Injured during fall?: No    Feels unsteady when standing or walking?: Yes    Worried about falling?: Yes      Urinary Incontinence Screening:   Patient has leaked urine accidently in the last six months.     Home Safety:  Patient has trouble with stairs inside or outside of their home. Patient has working smoke alarms and has working carbon monoxide detector.  Home safety hazards include: none.     Nutrition:   Current diet is Low Carb.     Medications:   Patient is currently taking over-the-counter supplements. OTC medications include: see medication list. Patient is able to manage medications.     Activities of Daily Living (ADLs)/Instrumental Activities of Daily Living (IADLs):   Walk and transfer into and out of bed and chair?: Yes  Dress and groom yourself?: Yes    Bathe or shower yourself?: Yes    Feed yourself? Yes  Do your laundry/housekeeping?: Yes  Manage your money, pay your bills and track your expenses?: Yes  Make your own meals?: Yes    Do your own shopping?: Yes    Previous Hospitalizations:   Any hospitalizations or ED visits within the last 12 months?: Yes    How many hospitalizations have you had in the last year?: 1-2    Advance Care Planning:   Living will: Yes    Durable POA for healthcare: Yes    Advanced directive: Yes      Cognitive Screening:   Provider or family/friend/caregiver concerned regarding cognition?: No    PREVENTIVE SCREENINGS      Cardiovascular Screening:    General: Screening Current      Diabetes Screening:     General: Screening Not Indicated and History Diabetes      Colorectal Cancer Screening:     General: Screening Current      Breast Cancer Screening:     General: Screening Current      Cervical Cancer Screening:    General: Patient Declines      Osteoporosis Screening:    General: Patient Declines      Abdominal Aortic Aneurysm (AAA) Screening:        General: Screening Not Indicated      Lung Cancer Screening:     General: Screening Not Indicated      Hepatitis C Screening:    General: Screening Current    Screening, Brief Intervention, and Referral to Treatment (SBIRT)    Screening      AUDIT-C Screenin) How often did you have a drink containing alcohol in the past year? never  2) How many drinks did you have on a typical day when you were drinking in the past year? 0  3) How often did you have 6 or more drinks on one  "occasion in the past year? never    AUDIT-C Score: 0  Interpretation: Score 0-2 (female): Negative screen for alcohol misuse    Single Item Drug Screening:  How often have you used an illegal drug (including marijuana) or a prescription medication for non-medical reasons in the past year? never    Single Item Drug Screen Score: 0  Interpretation: Negative screen for possible drug use disorder    Review of Current Opioid Use    Opioid Risk Tool (ORT) Interpretation: Complete Opioid Risk Tool (ORT)    Vision Screening    Right eye Left eye Both eyes   Without correction      With correction 20/25 20/25 20/20        Physical Exam:     /82 (BP Location: Right arm, Patient Position: Sitting, Cuff Size: Standard)   Pulse 70   Temp 97.6 °F (36.4 °C) (Tympanic)   Ht 5' 2.5\" (1.588 m)   Wt 116 kg (255 lb 9.6 oz)   SpO2 99%   BMI 46.00 kg/m²     Physical Exam  Vitals and nursing note reviewed.   Constitutional:       General: She is not in acute distress.     Appearance: She is well-groomed. She is not ill-appearing, toxic-appearing or diaphoretic.   HENT:      Head: Normocephalic and atraumatic.      Right Ear: Tympanic membrane, ear canal and external ear normal.      Left Ear: Tympanic membrane, ear canal and external ear normal.      Nose: Nose normal.      Mouth/Throat:      Lips: Pink.      Mouth: Mucous membranes are moist.      Pharynx: Oropharynx is clear. Uvula midline.      Tonsils: 0 on the right. 0 on the left.   Eyes:      General: Lids are normal.      Extraocular Movements: Extraocular movements intact.      Conjunctiva/sclera: Conjunctivae normal.      Pupils: Pupils are equal, round, and reactive to light.   Neck:      Thyroid: No thyroid mass, thyromegaly or thyroid tenderness.      Vascular: No JVD.      Trachea: Trachea and phonation normal.   Cardiovascular:      Rate and Rhythm: Normal rate and regular rhythm.      Pulses: Normal pulses.      Heart sounds: Normal heart sounds.   Pulmonary: "      Effort: Pulmonary effort is normal.      Breath sounds: Normal breath sounds and air entry.   Abdominal:      General: Bowel sounds are normal. There is no distension or abdominal bruit.      Palpations: Abdomen is soft. There is no hepatomegaly, splenomegaly or mass.      Tenderness: There is no abdominal tenderness.      Hernia: There is no hernia in the ventral area.   Musculoskeletal:      Cervical back: Neck supple.      Right lower leg: No edema.      Left lower leg: No edema.   Lymphadenopathy:      Cervical: No cervical adenopathy.   Skin:     General: Skin is warm and dry.      Capillary Refill: Capillary refill takes less than 2 seconds.      Coloration: Skin is not pale.   Neurological:      Mental Status: She is alert and oriented to person, place, and time.      Cranial Nerves: Cranial nerves 2-12 are intact.      Sensory: Sensation is intact.      Motor: Motor function is intact.      Coordination: Coordination is intact.      Gait: Gait normal.      Deep Tendon Reflexes: Reflexes are normal and symmetric.   Psychiatric:         Mood and Affect: Mood normal.         Behavior: Behavior normal. Behavior is cooperative.        Micki Pool,

## 2023-12-22 ENCOUNTER — TELEPHONE (OUTPATIENT)
Dept: OTHER | Facility: OTHER | Age: 63
End: 2023-12-22

## 2023-12-22 DIAGNOSIS — Z79.899 LONG TERM CURRENT USE OF DIURETIC: ICD-10-CM

## 2023-12-22 DIAGNOSIS — I45.81 PROLONGED QT SYNDROME: Primary | ICD-10-CM

## 2023-12-22 NOTE — TELEPHONE ENCOUNTER
"Ok- I placed ASAP order for [facility].  Also, please advise pt that I'd like her to get labs done to repeat potassium level and check magnesium level - the EKG abnormality sometimes can be caused by either low potassium or low magnesium, among other possibilities. Her potassium level was normal on most recent labwork (just for documentation: \"external order panel\" 11/16/2023 K 4.0, calcium normal), but on chart review K was low in October this year, and I don't find any magnesium testing in past  "

## 2023-12-22 NOTE — TELEPHONE ENCOUNTER
Call placed to patient to make her aware. Patient states she scheduled an appt with [facility] for 01/04/24.  Cardiology called her to schedule but 1st available is not until February 2024. Advised patient of bloodwork orders. Call placed to  Cardiology to see if I could get her a sooner appt. Message sent to [place] [facility] [place] offices to call patient back on Tuesday to see what dates/times are available. Patient notified.

## 2023-12-22 NOTE — TELEPHONE ENCOUNTER
Pt is looking to make an ASAP appointment as there is a referral from Dr. Pool. Please call patient when office reopens.

## 2023-12-23 ENCOUNTER — APPOINTMENT (OUTPATIENT)
Dept: LAB | Facility: CLINIC | Age: 63
End: 2023-12-23
Payer: MEDICARE

## 2023-12-23 DIAGNOSIS — I45.81 PROLONGED QT SYNDROME: ICD-10-CM

## 2023-12-23 DIAGNOSIS — Z79.899 LONG TERM CURRENT USE OF DIURETIC: ICD-10-CM

## 2023-12-23 LAB
ANION GAP SERPL CALCULATED.3IONS-SCNC: 9 MMOL/L
BUN SERPL-MCNC: 10 MG/DL (ref 5–25)
CALCIUM SERPL-MCNC: 9.9 MG/DL (ref 8.4–10.2)
CHLORIDE SERPL-SCNC: 103 MMOL/L (ref 96–108)
CO2 SERPL-SCNC: 30 MMOL/L (ref 21–32)
CREAT SERPL-MCNC: 0.64 MG/DL (ref 0.6–1.3)
GFR SERPL CREATININE-BSD FRML MDRD: 95 ML/MIN/1.73SQ M
GLUCOSE P FAST SERPL-MCNC: 116 MG/DL (ref 65–99)
MAGNESIUM SERPL-MCNC: 2.1 MG/DL (ref 1.9–2.7)
POTASSIUM SERPL-SCNC: 3.6 MMOL/L (ref 3.5–5.3)
SODIUM SERPL-SCNC: 142 MMOL/L (ref 135–147)

## 2023-12-23 PROCEDURE — 36415 COLL VENOUS BLD VENIPUNCTURE: CPT

## 2023-12-23 PROCEDURE — 83735 ASSAY OF MAGNESIUM: CPT

## 2023-12-23 PROCEDURE — 80048 BASIC METABOLIC PNL TOTAL CA: CPT

## 2023-12-27 DIAGNOSIS — I10 HYPERTENSION, BENIGN: Primary | ICD-10-CM

## 2023-12-27 PROBLEM — M48.061 SPINAL STENOSIS OF LUMBAR REGION, UNSPECIFIED WHETHER NEUROGENIC CLAUDICATION PRESENT: Status: ACTIVE | Noted: 2023-04-03

## 2023-12-27 RX ORDER — HYDROCHLOROTHIAZIDE 12.5 MG/1
12.5 TABLET ORAL DAILY
Qty: 30 TABLET | Refills: 0 | Status: SHIPPED | OUTPATIENT
Start: 2023-12-27

## 2023-12-27 NOTE — TELEPHONE ENCOUNTER
Requested medication(s) are due for refill today: Yes  Patient has already received a courtesy refill: No  Other reason request has been forwarded to provider: med was given by another provider

## 2023-12-29 ENCOUNTER — TELEPHONE (OUTPATIENT)
Dept: NEUROLOGY | Facility: CLINIC | Age: 63
End: 2023-12-29

## 2023-12-29 ENCOUNTER — CONSULT (OUTPATIENT)
Dept: ENDOCRINOLOGY | Facility: CLINIC | Age: 63
End: 2023-12-29
Payer: MEDICARE

## 2023-12-29 VITALS
RESPIRATION RATE: 14 BRPM | SYSTOLIC BLOOD PRESSURE: 120 MMHG | HEART RATE: 63 BPM | OXYGEN SATURATION: 96 % | WEIGHT: 256.8 LBS | DIASTOLIC BLOOD PRESSURE: 80 MMHG | HEIGHT: 62 IN | TEMPERATURE: 97 F | BODY MASS INDEX: 47.26 KG/M2

## 2023-12-29 DIAGNOSIS — E88.810 DYSMETABOLIC SYNDROME X: ICD-10-CM

## 2023-12-29 DIAGNOSIS — E55.9 VITAMIN D DEFICIENCY: ICD-10-CM

## 2023-12-29 DIAGNOSIS — E78.2 MIXED HYPERLIPIDEMIA: ICD-10-CM

## 2023-12-29 DIAGNOSIS — R68.89 CUSHINGOID FACIES: ICD-10-CM

## 2023-12-29 DIAGNOSIS — E65 CENTRAL OBESITY: ICD-10-CM

## 2023-12-29 DIAGNOSIS — E11.9 TYPE 2 DIABETES MELLITUS WITHOUT COMPLICATION, WITHOUT LONG-TERM CURRENT USE OF INSULIN (HCC): Primary | ICD-10-CM

## 2023-12-29 DIAGNOSIS — I10 PRIMARY HYPERTENSION: ICD-10-CM

## 2023-12-29 PROCEDURE — 99204 OFFICE O/P NEW MOD 45 MIN: CPT | Performed by: STUDENT IN AN ORGANIZED HEALTH CARE EDUCATION/TRAINING PROGRAM

## 2023-12-29 NOTE — PROGRESS NOTES
"    New Patient Progress Note      Cc: diabetes    Referring Provider  Micki Pool Do  [address] [facility]  [address] [place] [address]     History of Present Illness:   Hanna Hernandez is a 63 y.o. female with a history of type 2 diabetes since 2021 currently metformin  mg daily,  She checks her blood sugars every morning and some times after eating, and blood sugars are ranging from 120 - 150 fasting and postprandial was below 150. Most recent A1C of 6.2% in November 2023.   She was started on Ozempic by weight management, she states that \" my blood sugar went up\"    She has been checked for Cushing's syndrome due to puffy face,unable to lose weight, leg swelling    Component      Latest Ref Rng 11/18/2023 11/30/2023 12/14/2023   Cortisol - AM      6.7 - 22.6 ug/dL <0.4 (L)      SALIVARY CORTISOL      ug/dL  <0.010  <0.010         Diabetes education: YES    Physically active:No              Opthamology: UTD  Podiatry: UTD      Has hypertension: followed by PCP; not on ACE inhibitor/ARB, }  Has hyperlipidemia: followed by PCP; not on statin - she takes red yeast rice    Thyroid disorders: no  History of pancreatitis: no    Patient Active Problem List   Diagnosis    Anxiety    Hypertension, benign    Morbid obesity (HCC)    GERD without esophagitis    Family hx of colon cancer    SIXTO on CPAP    PONV (postoperative nausea and vomiting)    Back pain    Slow transit constipation    Type 2 diabetes mellitus without complication (HCC)    Depression due to physical illness    Continuous opioid dependence (HCC)    Central obesity    Tremor    Chronic pain syndrome    Diarrhea    Vertigo    Spinal stenosis of lumbar region, unspecified whether neurogenic claudication present    Dysmetabolic syndrome X    Herniation of cervical intervertebral disc with radiculopathy    Lumbar foraminal stenosis    Lumbosacral spondylosis without myelopathy    Lumbar radiculopathy    Cushingoid facies      Past Medical " History:   Diagnosis Date    Anemia     Anusitis     history of    Anxiety     CAD (coronary artery disease) 2008    Colon polyp     Deep vein thrombosis (HCC)     Depression     Diabetes mellitus (HCC)     Diverticulosis 2006    Fatty liver     Fibrocystic breast     Fibromyalgia     Fibromyalgia, primary     GERD (gastroesophageal reflux disease)     H. pylori infection 12/1999    treated with Prilosec and Biaxin,  Recurrent H. pylori stool antigen positive 06/03 treated with Aciphex, amoxicillin and Biaxin unsuccessfully with recurrent positivity 12/03, treated Prilosec and Biaxin 01/04.  Positive breath test , 05/04 with Aciphex therapy used for 2 weeks 08/04.    History of DVT (deep vein thrombosis)     following knee replacement    History of panic attacks     Hyperlipidemia     Hypertension     Obesity     PONV (postoperative nausea and vomiting)     Thyroid nodule       Past Surgical History:   Procedure Laterality Date    BREAST SURGERY      CARDIAC SURGERY      cardiac cath    CARPAL TUNNEL RELEASE Right 03/2013    CHOLECYSTECTOMY  1984    COLONOSCOPY  2006    Diverticulosis    COLONOSCOPY  2011    2 hyperplastic polyps removed    COLONOSCOPY      COMBINED REDUCTION MAMMAPLASTY W/ ABDOMINOPLASTY  2005    FOOT SURGERY      HYSTERECTOMY      for prolapsed uterus with retention of ovaries    JOINT REPLACEMENT Left     TKR and revision    REPLACEMENT TOTAL KNEE      followed by DVT;  ANTIBIOTICS TO BE USED INDEFINITELY BEFORE DENTAL PROCEDURES.    US GUIDED THYROID BIOPSY  11/15/2017    WRIST SURGERY Right     x5      Family History   Problem Relation Age of Onset    Obesity Mother     Colon cancer Mother     Cancer Mother         colon cancer    Obesity Father     Heart disease Father     Diabetes Father     Depression Father     Hypertension Father     OCD Father     Obesity Sister     Diverticulitis Sister     Obesity Sister     Diverticulitis Sister     Other Sister         perforated colon    Colon polyps  Sister     Obesity Brother     Colon polyps Brother     Colon polyps Maternal Grandmother     Stroke Maternal Grandfather     Obesity Paternal Grandmother     Stroke Paternal Grandmother     Heart disease Paternal Grandmother     Obesity Paternal Grandfather     Stroke Paternal Grandfather     Heart disease Paternal Grandfather     Colon cancer Maternal Uncle      Social History     Tobacco Use    Smoking status: Former     Current packs/day: 0.00     Average packs/day: 1.5 packs/day for 5.0 years (7.5 ttl pk-yrs)     Types: Cigarettes     Start date: 1981     Quit date: 1986     Years since quittin.7    Smokeless tobacco: Never    Tobacco comments:     Quit at age 25   Substance Use Topics    Alcohol use: Not Currently     Allergies   Allergen Reactions    Codeine Other (See Comments)     ? stopped breathing, janine morphine 10/05      Iv Contrast [Iodinated Contrast Media] Shortness Of Breath    Other Shortness Of Breath    Ampicillin GI Intolerance     Dizzy, passed out      Cimetidine Other (See Comments)     Pain in stomach      Clam Shell - Food Allergy Throat Swelling    Fentanyl GI Intolerance      janine Dilaudid      Only patch      Morphine GI Intolerance    Oxycodone-Acetaminophen Other (See Comments)     Hallucinations from Percocet      Tetracycline Rash         Current Outpatient Medications:     ascorbic acid (VITAMIN C) 250 mg tablet, Take 500 mg by mouth 2 (two) times a day, Disp: , Rfl:     bisoprolol-hydrochlorothiazide (ZIAC) 10-6.25 MG per tablet, Take 1 tablet by mouth every morning, Disp: , Rfl:     Cholecalciferol (D3-1000 PO), Take 5,000 Int'l Units/day by mouth in the morning, Disp: , Rfl:     DULoxetine (CYMBALTA) 60 mg delayed release capsule, , Disp: , Rfl:     escitalopram (LEXAPRO) 5 mg tablet, Take 15 mg by mouth, Disp: , Rfl:     hydrochlorothiazide (HYDRODIURIL) 12.5 mg tablet, Take 1 tablet (12.5 mg total) by mouth daily, Disp: 30 tablet, Rfl: 0     "HYDROcodone-acetaminophen (NORCO) 5-325 mg per tablet, TAKE 1-2 TABLETS BY MOUTH EVERY 12 (TWELVE) HOURS AS NEEDED FOR PAIN. MAX DAILY AMOUNT: 4 TABLETS., Disp: , Rfl:     metFORMIN (GLUCOPHAGE-XR) 750 mg 24 hr tablet, , Disp: , Rfl:     omeprazole (PriLOSEC) 20 mg delayed release capsule, Take 1 capsule (20 mg total) by mouth daily, Disp: 30 capsule, Rfl: 3    Zinc Acetate, Oral, (ZINC ACETATE PO), Take by mouth, Disp: , Rfl:   Review of Systems   Constitutional:  Negative for appetite change, fatigue and unexpected weight change.   HENT:  Positive for congestion and sneezing. Negative for trouble swallowing and voice change.    Eyes:  Negative for visual disturbance.   Respiratory:  Negative for cough, shortness of breath and wheezing.    Cardiovascular:  Negative for palpitations and leg swelling.   Gastrointestinal:  Negative for abdominal pain, constipation, diarrhea, nausea and vomiting.   Endocrine: Negative for cold intolerance, heat intolerance, polyphagia and polyuria.   Musculoskeletal:  Positive for back pain and neck pain. Negative for arthralgias.   Skin:  Negative for color change, rash and wound.   Neurological:  Negative for dizziness, tremors, weakness, light-headedness, numbness and headaches.   Psychiatric/Behavioral:  Negative for agitation and sleep disturbance. The patient is not nervous/anxious.        Physical Exam:  Body mass index is 46.97 kg/m².  Pulse 63   Temp (!) 97 °F (36.1 °C)   Resp 14   Ht 5' 2\" (1.575 m)   Wt 116 kg (256 lb 12.8 oz)   SpO2 96%   BMI 46.97 kg/m²    Wt Readings from Last 3 Encounters:   12/29/23 116 kg (256 lb 12.8 oz)   12/21/23 116 kg (255 lb 9.6 oz)   11/28/23 116 kg (255 lb 3.2 oz)       GEN: NAD  E/n/m nl facies,   Eyes: no stare or proptosis,   Neck: trachea midline, thyroid NT to palpation, nl in size, no nodules or neck masses noted, no cervical LAD  CV; heart reg rate s1s2 nl, no m/r/g appreciated, no DARLENE  Resp: CTAB, good effort  Ab+BS  Neuro: no " "tremor, 2+ DTRs in BUE  MS: no c/c in digits, moves all 4 ext, nl muscle bulk, gait nl  Skin: warm and dry, no palmar erythema  Ext: no c/c in digits, no edema bilaterally, 2+ DP and PT pulses bilat,   Psych: nl mood and affect, no gross lapses in memory      Labs:   No components found for: \"HA1C\"  No components found for: \"GLU\"    Lab Results   Component Value Date    CREATININE 0.64 12/23/2023    CREATININE 0.69 10/02/2023    BUN 10 12/23/2023    K 3.6 12/23/2023     12/23/2023    CO2 30 12/23/2023     eGFR   Date Value Ref Range Status   12/23/2023 95 ml/min/1.73sq m Final     No components found for: \"MALBCRER\"    No results found for: \"CHOL\", \"HDL\", \"TRIG\", \"CHOLHDL\"    Lab Results   Component Value Date    ALT 31 10/02/2023    AST 15 10/02/2023    ALKPHOS 58 10/02/2023       No results found for: \"TSH\", \"FREET4\", \"TSI\"    Impression:  1. Type 2 diabetes mellitus without complication, without long-term current use of insulin (HCC)    2. Central obesity    3. Dysmetabolic syndrome X    4. Cushingoid facies           Plan:    Hanna was seen today for diabetes mellitus and consult.    Diagnoses and all orders for this visit:    Type 2 diabetes mellitus without complication, without long-term current use of insulin (HCC):  Diabetes is well controlled, with A1C < 6.5%,   Currently on Metformin  mg daily.  counseled on adverse side effects of GLP-1 GIP dual agonist including nausea, vomiting, pancreatitis, medullary thyroid cancer,. No FHx of MEN2. She understood these risks and wished to start therapy. Mounjaro 2.5 mg weekly for 4 weeks and then 5 mg weekly if she tolerated and has insurance coverage.  Return back in 3 months   Lab prior to next visit.       -     Ambulatory Referral to Endocrinology  -     tirzepatide (Mounjaro) 2.5 MG/0.5ML; Inject 0.5 mL (2.5 mg total) under the skin every 7 days  -     Hemoglobin A1C; Future  -     Comprehensive metabolic panel; Future  -     Lipid Panel with Direct " LDL reflex; Future    Central obesity  -     Ambulatory Referral to Endocrinology  -     Hemoglobin A1C; Future  -     Comprehensive metabolic panel; Future    Dysmetabolic syndrome X  -     Ambulatory Referral to Endocrinology    Cushingoid facies:  Cushing's sydrome was ruled out with appropriate response to low dose dexamethasone suppression test.    -     Ambulatory Referral to Endocrinology    Primary hypertension  -     Comprehensive metabolic panel; Future  -     Albumin / creatinine urine ratio; Future    Mixed hyperlipidemia    Vitamin D deficiency  -     Vitamin D 25 hydroxy; Future        Discussed with the patient and all questioned fully answered. She will call me if any problems arise.    Counseled patient on diagnostic results, prognosis, risk and benefit of treatment options, instruction for management, importance of treatment compliance, Risk  factor reduction and impressions      Marilu Mercado MD

## 2024-01-03 NOTE — TELEPHONE ENCOUNTER
Pt is now scheduled on 01/09/24 at 230pm with Major. Location address was provided. Referral was updated.

## 2024-01-09 ENCOUNTER — CONSULT (OUTPATIENT)
Dept: NEUROLOGY | Facility: CLINIC | Age: 64
End: 2024-01-09
Payer: MEDICARE

## 2024-01-09 VITALS
SYSTOLIC BLOOD PRESSURE: 140 MMHG | BODY MASS INDEX: 44.24 KG/M2 | HEIGHT: 63 IN | DIASTOLIC BLOOD PRESSURE: 90 MMHG | HEART RATE: 70 BPM | WEIGHT: 249.7 LBS

## 2024-01-09 DIAGNOSIS — G25.0 TREMOR, ESSENTIAL: Primary | ICD-10-CM

## 2024-01-09 DIAGNOSIS — R25.1 TREMOR: ICD-10-CM

## 2024-01-09 DIAGNOSIS — R26.89 IMBALANCE: ICD-10-CM

## 2024-01-09 DIAGNOSIS — R42 VERTIGO: ICD-10-CM

## 2024-01-09 DIAGNOSIS — M62.81 PROXIMAL MUSCLE WEAKNESS: ICD-10-CM

## 2024-01-09 DIAGNOSIS — M54.16 RADICULOPATHY, LUMBAR REGION: ICD-10-CM

## 2024-01-09 PROCEDURE — 99205 OFFICE O/P NEW HI 60 MIN: CPT | Performed by: STUDENT IN AN ORGANIZED HEALTH CARE EDUCATION/TRAINING PROGRAM

## 2024-01-09 NOTE — PROGRESS NOTES
[facility]  PATIENT:  Hanna Hernandez  MRN:  [id number]  :  1960  DATE OF SERVICE:  2024  REFERRED BY: Micki Pool DO  PMD: Micki Pool DO    Assessment/Plan:     Hanna Hernandez is a very pleasant 63 y.o. female with a past medical history that includes T2DM, SIXTO on CPAP, HTN, lumbar radiculopathy who presents for evaluation of tremors and muscle weakness.     Tremors  -exam notable for head and bilateral UE action/postural tremors c/w essential tremor. No parkinsonian features on exam today  -discussed possible therapeutic options. As symptoms are not particularly disruptive, pt deferred medical management at this time    Benign myoclonus  -MRI brain and EEG previously performed and negative. Occasional hand/arm/head twitches likely representative of benign myoclonus    Imbalance  Lumbar radiculopathy  -PT referral    CC:   Tremors, muscle weakness    History of Present Illness:     63 y.o. female with a past medical history that includes T2DM, SIXTO on CPAP, HTN, lumbar radiculopathy who presents for evaluation of tremors and muscle weakness.     Approximately 1 year ago began to notice tremors involving the hands and the head. She does not notice change in the tremors when she is drinking caffeine. She does not drink alcohol. States that sometimes this will be brought out by action, such as when she is using her hands to sew. States that her father had similar symptoms. Previously seen and evaluated by [facility] neuro who diagnosed her with benign myoclonus and essential tremor. She was started on lexapro 15 mg at that time. Lexapro recently increased to 20 mg and duloxetine decreased to 30 mg. She endorses nightly use of CPAP.    MRI brain/IAC perfomred 23 negative for any intracranial abnormalities. EEG negative for any epileptiform activity. EMG performed at [facility] in 2023 was also negative.      Past Medical History:     Past Medical History:   Diagnosis Date     Anemia     Anusitis     history of    Anxiety     CAD (coronary artery disease) 2008    Colon polyp     Deep vein thrombosis (HCC)     Depression     Diabetes mellitus (HCC)     Diverticulosis 2006    Fatty liver     Fibrocystic breast     Fibromyalgia     Fibromyalgia, primary     GERD (gastroesophageal reflux disease)     H. pylori infection 12/1999    treated with Prilosec and Biaxin,  Recurrent H. pylori stool antigen positive 06/03 treated with Aciphex, amoxicillin and Biaxin unsuccessfully with recurrent positivity 12/03, treated Prilosec and Biaxin 01/04.  Positive breath test , 05/04 with Aciphex therapy used for 2 weeks 08/04.    History of DVT (deep vein thrombosis)     following knee replacement    History of panic attacks     Hyperlipidemia     Hypertension     Obesity     PONV (postoperative nausea and vomiting)     Thyroid nodule        Patient Active Problem List   Diagnosis    Anxiety    Hypertension, benign    Morbid obesity (HCC)    GERD without esophagitis    Family hx of colon cancer    SIXTO on CPAP    PONV (postoperative nausea and vomiting)    Back pain    Slow transit constipation    Type 2 diabetes mellitus without complication (HCC)    Depression due to physical illness    Continuous opioid dependence (HCC)    Central obesity    Tremor    Chronic pain syndrome    Diarrhea    Vertigo    Spinal stenosis of lumbar region, unspecified whether neurogenic claudication present    Dysmetabolic syndrome X    Herniation of cervical intervertebral disc with radiculopathy    Lumbar foraminal stenosis    Lumbosacral spondylosis without myelopathy    Lumbar radiculopathy    Cushingoid facies       Medications:      Current Outpatient Medications   Medication Sig Dispense Refill    ascorbic acid (VITAMIN C) 250 mg tablet Take 500 mg by mouth 2 (two) times a day      bisoprolol-hydrochlorothiazide (ZIAC) 10-6.25 MG per tablet Take 1 tablet by mouth every morning      Cholecalciferol (D3-1000 PO) Take  5,000 Int'l Units/day by mouth in the morning      DULoxetine (CYMBALTA) 60 mg delayed release capsule 30 MG a day for two weeks, then 30 MG every other day for two weeks.      escitalopram (LEXAPRO) 5 mg tablet Take 20 mg by mouth      hydrochlorothiazide (HYDRODIURIL) 12.5 mg tablet Take 1 tablet (12.5 mg total) by mouth daily 30 tablet 0    HYDROcodone-acetaminophen (NORCO) 5-325 mg per tablet TAKE 1-2 TABLETS BY MOUTH EVERY 12 (TWELVE) HOURS AS NEEDED FOR PAIN. MAX DAILY AMOUNT: 4 TABLETS.      metFORMIN (GLUCOPHAGE-XR) 750 mg 24 hr tablet       omeprazole (PriLOSEC) 20 mg delayed release capsule Take 1 capsule (20 mg total) by mouth daily 30 capsule 3    Zinc Acetate, Oral, (ZINC ACETATE PO) Take by mouth      tirzepatide (Mounjaro) 2.5 MG/0.5ML Inject 0.5 mL (2.5 mg total) under the skin every 7 days 2 mL 0     No current facility-administered medications for this visit.        Allergies:      Allergies   Allergen Reactions    Codeine Other (See Comments)     ? stopped breathing, janine morphine 10/05      Iv Contrast [Iodinated Contrast Media] Shortness Of Breath    Other Shortness Of Breath    Ampicillin GI Intolerance     Dizzy, passed out      Cimetidine Other (See Comments)     Pain in stomach      Clam Shell - Food Allergy Throat Swelling    Fentanyl GI Intolerance      janine Dilaudid      Only patch      Morphine GI Intolerance    Oxycodone-Acetaminophen Other (See Comments)     Hallucinations from Percocet      Tetracycline Rash       Family History:     Family History   Problem Relation Age of Onset    Obesity Mother     Colon cancer Mother     Cancer Mother         colon cancer    Obesity Father     Heart disease Father     Diabetes Father     Depression Father     Hypertension Father     OCD Father     Obesity Sister     Diverticulitis Sister     Obesity Sister     Diverticulitis Sister     Other Sister         perforated colon    Colon polyps Sister     Obesity Brother     Colon polyps Brother     Colon  "polyps Maternal Grandmother     Stroke Maternal Grandfather     Obesity Paternal Grandmother     Stroke Paternal Grandmother     Heart disease Paternal Grandmother     Obesity Paternal Grandfather     Stroke Paternal Grandfather     Heart disease Paternal Grandfather     Colon cancer Maternal Uncle        Social History:       Social History     Socioeconomic History    Marital status: /Civil Union     Spouse name: Not on file    Number of children: Not on file    Years of education: Not on file    Highest education level: Not on file   Occupational History    Occupation: Disabled   Tobacco Use    Smoking status: Former     Current packs/day: 0.00     Average packs/day: 1.5 packs/day for 5.0 years (7.5 ttl pk-yrs)     Types: Cigarettes     Start date: 1981     Quit date: 1986     Years since quittin.7    Smokeless tobacco: Never    Tobacco comments:     Quit at age 25   Vaping Use    Vaping status: Never Used   Substance and Sexual Activity    Alcohol use: Not Currently    Drug use: Never    Sexual activity: Yes     Partners: Male     Birth control/protection: Surgical   Other Topics Concern    Not on file   Social History Narrative    , 3 children, 8 grandchildren     Social Determinants of Health     Financial Resource Strain: Medium Risk (2023)    Overall Financial Resource Strain (CARDIA)     Difficulty of Paying Living Expenses: Somewhat hard   Food Insecurity: Not on file   Transportation Needs: No Transportation Needs (2023)    PRAPARE - Transportation     Lack of Transportation (Medical): No     Lack of Transportation (Non-Medical): No   Physical Activity: Not on file   Stress: Not on file   Social Connections: Not on file   Intimate Partner Violence: Not on file   Housing Stability: Not on file         Objective:   /90 (BP Location: Right arm, Patient Position: Sitting, Cuff Size: Large)   Pulse 70   Ht 5' 2.5\" (1.588 m)   Wt 113 kg (249 lb 11.2 oz)   BMI " 44.94 kg/m²     General: Patient is not in any acute/apparent distress, well nourished, well developed and cooperative.   HEENT: normocephalic, atraumatic, moist membranes  Neck: supple  Heart: regular heart rate and rhythm, no murmurs, rubs and or gallops.   Skin: no lesions or rash  Musculosketal: no bony abnormalities    Neurologic Examination:   Mental status: alert, awake, oriented X 3 and following commands.     Speech/Language: Speech is fluent without any dysarthria, no aphasia noted, can name, repeat, comprehension intact    Cranial Nerves:   CN I: smell not tested  CN II: Visual fields full to confrontation  CN III, IV, VI: Extraocular movements intact bilaterally. Pupils equal round and reactive to light bilaterally.  CN V: Facial sensation is normal.  CN VII: Full and symmetric facial movement.  CN VIII: Hearing is normal.  CN IX, X: Palate elevates symmetrically.   CN XI: Shoulder shrug strength is normal.  CN XII: Tongue midline without atrophy or fasciculations.    Motor:   Strength 5/5 in all 4 extremities. Intermittent postural and action tremor noted in bilateral UE as well as head. Bulk/tone - normal.  Fasiculations - none    Sensory:   Sensation intact to soft touch in all 4 extremities.    Cerebellar:   Finger-to-nose intact, normal heel to shin.    Reflexes: 2+ in all 4 extremities  Pathologic reflexes - babinski reflex negative    Gait:   Normal gait, unable to tandem, Romberg sign negative     Review of Systems:     ROS:    Review of Systems   Constitutional:  Negative for appetite change, fatigue and fever.   HENT: Negative.  Negative for hearing loss, tinnitus, trouble swallowing and voice change.    Eyes: Negative.  Negative for photophobia, pain and visual disturbance.   Respiratory: Negative.  Negative for shortness of breath.    Cardiovascular: Negative.  Negative for palpitations.   Gastrointestinal: Negative.  Negative for nausea and vomiting.   Endocrine: Negative.  Negative for cold  intolerance.   Genitourinary: Negative.  Negative for dysuria, frequency and urgency.   Musculoskeletal:  Positive for gait problem. Negative for back pain, myalgias, neck pain and neck stiffness.   Skin: Negative.  Negative for rash.   Allergic/Immunologic: Negative.    Neurological:  Positive for tremors. Negative for dizziness, seizures, syncope, facial asymmetry, speech difficulty, weakness, light-headedness, numbness and headaches.        Patient stated that she has head and hand tremors.    Hematological: Negative.  Does not bruise/bleed easily.   Psychiatric/Behavioral: Negative.  Negative for confusion, hallucinations and sleep disturbance.      I have spent a total time of 52 minutes on 01/09/24 in caring for this patient including Risks and benefits of tx options, Instructions for management, Patient and family education, Risk factor reductions, Impressions, Documenting in the medical record, Reviewing / ordering tests, medicine, procedures  , and Obtaining or reviewing history  .

## 2024-01-09 NOTE — PATIENT INSTRUCTIONS
Patient Instructions:  -physical therapy referral placed  -follow up in about 6 months    Essential Tremor   AMBULATORY CARE:   An essential tremor  is uncontrolled muscle shaking that has no known cause. Your healthcare provider can diagnose essential tremor based on your signs and symptoms. Other tests may be used to make sure another condition is not causing the tremors. Essential tremors can happen at any age but are most common after 40 years of age.  Common signs and symptoms of essential tremor:  Signs and symptoms may be mild or severe. You may have tremors when you try to hold still or when you move. Any of the following may get worse slowly, over time:  Hands or arms shake, especially when you hold or reach for objects    Voice quivers when you speak    Legs shake when you stand still    Trouble controlling your hands or arms, holding objects, or writing    Trouble doing daily activities such as brushing your teeth or shaving    Head nodding or shaking you cannot control    Shaking brought on or made worse by stress, fatigue, or chemicals such as caffeine    A feeling of shaking or trembling inside, even if your body does not shake    Contact your healthcare provider if:   You have new or worsening symptoms.    You have questions or concerns about your condition or care.    Treatment  may not be needed. If your tremors are severe or keep you from doing daily activities, the following may help:  Medicines  may be given to control tremors so you can do your normal activities more easily. Medicines will not completely control or stop the tremors, but they may help.     Deep brain stimulation  is a procedure used if other treatments do not control your tremors. Electric stimulation is given to parts of the brain that control movement. The stimulation stops those parts of the brain from working.     Surgery  may be used if your tremors are severe and medicines have not helped. Surgery may be used to destroy  part of your thalamus. The thalamus is a part of the brain that control movement and coordination.    Manage your symptoms:   Go to occupational therapy as directed.  An occupational therapist can help you find new ways to do your daily activities. For example, you may learn to use wrist weights during activities such as brushing your teeth. The weights can help steady your arms and hands.    Use assistive devices as directed.  Weighted utensils can help you control your movements as you eat. Adaptive equipment, such as a [employer] mouse, for the computer can help make computers easier to use. It may also help to install voice recognition software on your computer. The software allows you to talk instead of using the keyboard to type. Electric appliances such as can openers and toothbrushes can make daily living activities easier. Ask your healthcare provider or occupational therapist for more information on assistive devices.    Wear clothing that is easy to put on and take off.  Examples include shoes that do not need laces and shirts without buttons. You can also get a device to help you get buttons through the button holes.    Do not have caffeine.  Caffeine can make tremors worse.     Do not smoke.  Nicotine and other chemicals in cigarettes and cigars can make tremors worse. Ask your healthcare provider for information if you currently smoke and need help to quit. E-cigarettes or smokeless tobacco still contain nicotine. Talk to your healthcare provider before you use these products.     Manage stress.  Stress can trigger tremors or make them worse. Find ways to manage stress, such as deep breathing, listening to music, or getting a massage. Talk to your healthcare provider if you need help to control anxiety.    Ask about medicines.  Some medicines can make tremors worse. An example is cold medicines. Talk to your healthcare provider about all the medicines you take.    Keep a record of your tremors.  Include  when the tremors happened, and how long they lasted. List anything you think might have triggered the tremors or made them stop. It might be helpful to include any foods or drinks you had that contained caffeine or were unusual for you. Bring the record with you to your follow-up appointments.    Follow up with your healthcare provider as directed:  You may be referred to a neurologist (nerve specialist). Write down your questions so you remember to ask them during your visits.  © Copyright [employer] 2023 Information is for End User's use only and may not be sold, redistributed or otherwise used for commercial purposes.  The above information is an  only. It is not intended as medical advice for individual conditions or treatments. Talk to your doctor, nurse or pharmacist before following any medical regimen to see if it is safe and effective for you.

## 2024-01-25 DIAGNOSIS — I10 HYPERTENSION, BENIGN: ICD-10-CM

## 2024-01-25 RX ORDER — HYDROCHLOROTHIAZIDE 12.5 MG/1
12.5 TABLET ORAL DAILY
Qty: 30 TABLET | Refills: 5 | Status: SHIPPED | OUTPATIENT
Start: 2024-01-25

## 2024-02-20 ENCOUNTER — TELEPHONE (OUTPATIENT)
Dept: FAMILY MEDICINE CLINIC | Facility: CLINIC | Age: 64
End: 2024-02-20

## 2024-02-20 DIAGNOSIS — Z86.39 HISTORY OF HYPOKALEMIA: ICD-10-CM

## 2024-02-20 DIAGNOSIS — Z79.899 LONG TERM CURRENT USE OF DIURETIC: ICD-10-CM

## 2024-02-20 DIAGNOSIS — R60.0 BILATERAL LEG EDEMA: Primary | ICD-10-CM

## 2024-02-20 RX ORDER — POTASSIUM CHLORIDE 750 MG/1
10 TABLET, EXTENDED RELEASE ORAL DAILY PRN
Qty: 10 TABLET | Refills: 0 | Status: SHIPPED | OUTPATIENT
Start: 2024-02-20 | End: 2024-02-29

## 2024-02-20 RX ORDER — FUROSEMIDE 20 MG/1
20 TABLET ORAL DAILY PRN
Qty: 10 TABLET | Refills: 0 | Status: SHIPPED | OUTPATIENT
Start: 2024-02-20 | End: 2024-02-29

## 2024-02-20 NOTE — TELEPHONE ENCOUNTER
Please advise pt that it's not appropriate to increase her HCTZ to 25mg, as she already is on additional HCTZ in the Ziac pill (bisoprolol/HCTZ) that her cardiologist prescribes.  I can rx a small dose of lasix to take as needed for the leg swelling, but she will also need to take a prescription potassium supplement pill with each dose of the lasix, as her potassium was low at the ER in October and prior to that.  Also, she'll need to discuss this leg swelling with her cardiologist at her f/u appt there         All Conversations: Hydrochlorothiazide   February 19, 2024  Sarika Wheeler   to Me   KL    2/19/24  4:45 PM  Please advise if a stronger medication can be given to pt or if she needs an appt for swelling  CR    2/19/24  3:50 PM  Kenyatta Schneider routed this conversation to [facility]  Hanna Hernandez   to  Primary [facility] (supporting You)   MB      2/19/24  2:09 PM  I had requested a refill of this medication and requested a higher dosage. Apparently it went back to whoever originally had ordered it. I don’t even recognize the name. So it saying it’s not available for refill at this time.  Therefore, I am asking you if you would be willing to call in a refill. I am currently taking 12.5, however, I am still dealing with some swelling and would like something a little stronger if possible  Thank you,  Hanna

## 2024-02-22 ENCOUNTER — OFFICE VISIT (OUTPATIENT)
Dept: FAMILY MEDICINE CLINIC | Facility: CLINIC | Age: 64
End: 2024-02-22
Payer: MEDICARE

## 2024-02-22 VITALS
HEART RATE: 60 BPM | SYSTOLIC BLOOD PRESSURE: 130 MMHG | BODY MASS INDEX: 45.61 KG/M2 | WEIGHT: 257.4 LBS | OXYGEN SATURATION: 98 % | DIASTOLIC BLOOD PRESSURE: 78 MMHG | TEMPERATURE: 95.7 F | HEIGHT: 63 IN

## 2024-02-22 DIAGNOSIS — R10.2 VAGINAL PAIN: ICD-10-CM

## 2024-02-22 DIAGNOSIS — F11.20 CONTINUOUS OPIOID DEPENDENCE (HCC): ICD-10-CM

## 2024-02-22 DIAGNOSIS — E66.01 MORBID OBESITY (HCC): ICD-10-CM

## 2024-02-22 DIAGNOSIS — E66.9 DIABETES MELLITUS TYPE 2 IN OBESE: ICD-10-CM

## 2024-02-22 DIAGNOSIS — R19.8 ALTERNATING CONSTIPATION AND DIARRHEA: ICD-10-CM

## 2024-02-22 DIAGNOSIS — R10.824 LEFT LOWER QUADRANT ABDOMINAL TENDERNESS WITH REBOUND TENDERNESS: ICD-10-CM

## 2024-02-22 DIAGNOSIS — E11.69 DIABETES MELLITUS TYPE 2 IN OBESE: ICD-10-CM

## 2024-02-22 DIAGNOSIS — N94.9 VAGINAL DISCOMFORT: ICD-10-CM

## 2024-02-22 DIAGNOSIS — R10.30 LOWER ABDOMINAL PAIN: Primary | ICD-10-CM

## 2024-02-22 LAB
SL AMB  POCT GLUCOSE, UA: ABNORMAL
SL AMB LEUKOCYTE ESTERASE,UA: ABNORMAL
SL AMB POCT BILIRUBIN,UA: ABNORMAL
SL AMB POCT BLOOD,UA: ABNORMAL
SL AMB POCT CLARITY,UA: CLEAR
SL AMB POCT COLOR,UA: YELLOW
SL AMB POCT KETONES,UA: ABNORMAL
SL AMB POCT NITRITE,UA: ABNORMAL
SL AMB POCT PH,UA: 6
SL AMB POCT SPECIFIC GRAVITY,UA: 1.01
SL AMB POCT URINE PROTEIN: ABNORMAL
SL AMB POCT UROBILINOGEN: ABNORMAL

## 2024-02-22 PROCEDURE — 81002 URINALYSIS NONAUTO W/O SCOPE: CPT | Performed by: FAMILY MEDICINE

## 2024-02-22 PROCEDURE — 99214 OFFICE O/P EST MOD 30 MIN: CPT | Performed by: FAMILY MEDICINE

## 2024-02-22 RX ORDER — POTASSIUM CHLORIDE 750 MG/1
TABLET, FILM COATED, EXTENDED RELEASE ORAL
COMMUNITY
Start: 2024-02-20 | End: 2024-02-29 | Stop reason: SDUPTHER

## 2024-02-22 RX ORDER — METHOCARBAMOL 750 MG/1
TABLET, FILM COATED ORAL
COMMUNITY
Start: 2024-01-22

## 2024-02-22 NOTE — PROGRESS NOTES
"Name: Hanna Hernandez      : 1960      MRN: [id number]  Encounter Provider: Micki Pool DO  Encounter Date: 2024   Encounter department: [facility]    Assessment & Plan     1. Lower abdominal pain  -     CT abdomen pelvis wo contrast; Future; Expected date: 2024    2. Left lower quadrant abdominal tenderness with rebound tenderness  -     CT abdomen pelvis wo contrast; Future; Expected date: 2024    3. Vaginal pain  -     CT abdomen pelvis wo contrast; Future; Expected date: 2024    4. Vaginal discomfort  -     POCT urine dip    5. Alternating constipation and diarrhea  -     CT abdomen pelvis wo contrast; Future; Expected date: 2024    6. Diabetes mellitus type 2 in obese     7. Morbid obesity (HCC)    8. Continuous opioid dependence (HCC)  Comments:  managed by specialist    acute abd pain; LLQ tenderness with rebound and guarding; alternating constipation and diarrhea; vaginal pain in postmenopausal diabetic patient       Subjective      Chief Complaint   Patient presents with    Vaginitis     Possible yeast infection, for weeks, tried OTC med, has vaginal pain, has low vaginal cramping, having problems from constipation and diarrhea which is making her have abdominal cramping, yesterday pt states it felt like she was developing a UTI, has vaginal discomfort, could not get in with OBGYN, pt denies vaginal discharges, states she also noticed a vaginal odor       Same day sick appt- postmenopausal female c/o concerned she might have a vaginal yeast infection  \"Have some vaginal pain, getting cramping down low, also belly cramps and back to diarrhea and constipation, sick to my stomach, have had the chills really bad and my temperature is low\"  \"Did take mucinex yesterday because I was feeling congested\"  \"I'm having 2 surgeries on the \" - right hand/wrist  Denies any vaginal d/c or bleeding  No bloody stools/rectal bleeding  No vomiting " "  Had called her GYN for the sx - at [facility] - but couldn't get in for appt soon enough  Does admit abd bloating and increased gas assoc      Review of Systems    Current Outpatient Medications on File Prior to Visit   Medication Sig    ascorbic acid (VITAMIN C) 250 mg tablet Take 500 mg by mouth 2 (two) times a day    bisoprolol-hydrochlorothiazide (ZIAC) 10-6.25 MG per tablet Take 1 tablet by mouth every morning    Cholecalciferol (D3-1000 PO) Take 5,000 Int'l Units/day by mouth in the morning    escitalopram (LEXAPRO) 5 mg tablet Take 20 mg by mouth    hydrochlorothiazide (HYDRODIURIL) 12.5 mg tablet Take 1 tablet (12.5 mg total) by mouth daily    HYDROcodone-acetaminophen (NORCO) 5-325 mg per tablet TAKE 1-2 TABLETS BY MOUTH EVERY 12 (TWELVE) HOURS AS NEEDED FOR PAIN. MAX DAILY AMOUNT: 4 TABLETS.    metFORMIN (GLUCOPHAGE-XR) 750 mg 24 hr tablet     methocarbamol (ROBAXIN) 750 mg tablet     omeprazole (PriLOSEC) 20 mg delayed release capsule Take 1 capsule (20 mg total) by mouth daily    potassium chloride (Klor-Con) 10 mEq tablet     Zinc Acetate, Oral, (ZINC ACETATE PO) Take by mouth    DULoxetine (CYMBALTA) 60 mg delayed release capsule 30 MG a day for two weeks, then 30 MG every other day for two weeks. (Patient not taking: Reported on 2/22/2024)    furosemide (LASIX) 20 mg tablet Take 1 tablet (20 mg total) by mouth daily as needed (leg edema) (Patient not taking: Reported on 2/22/2024)    potassium chloride (Klor-Con M10) 10 mEq tablet Take 1 tablet (10 mEq total) by mouth daily as needed (use of lasix) Take with dose of lasix when needed (Patient not taking: Reported on 2/22/2024)       Objective     /78 (BP Location: Left arm, Patient Position: Sitting, Cuff Size: Standard)   Pulse 60   Temp (!) 95.7 °F (35.4 °C) (Tympanic)   Ht 5' 2.5\" (1.588 m)   Wt 117 kg (257 lb 6.4 oz)   SpO2 98%   BMI 46.33 kg/m²     Physical Exam  Vitals and nursing note reviewed.   Constitutional:       " General: She is not in acute distress.     Appearance: She is well-groomed. She is not ill-appearing, toxic-appearing or diaphoretic.   HENT:      Head: Normocephalic and atraumatic.      Mouth/Throat:      Pharynx: Uvula midline.   Neck:      Trachea: Phonation normal.   Pulmonary:      Effort: Pulmonary effort is normal.      Breath sounds: Normal breath sounds and air entry.   Abdominal:      General: Bowel sounds are decreased. There is distension.      Palpations: Abdomen is soft. There is no hepatomegaly, splenomegaly or mass.      Tenderness: There is abdominal tenderness in the left lower quadrant. There is guarding and rebound.      Hernia: There is no hernia in the ventral area.   Skin:     General: Skin is warm and dry.      Coloration: Skin is not pale.   Neurological:      Mental Status: She is alert and oriented to person, place, and time.      Gait: Gait normal.   Psychiatric:         Mood and Affect: Mood normal.         Behavior: Behavior normal. Behavior is cooperative.         Cognition and Memory: Cognition normal.       Micki Pool,

## 2024-02-23 ENCOUNTER — HOSPITAL ENCOUNTER (OUTPATIENT)
Dept: CT IMAGING | Facility: HOSPITAL | Age: 64
Discharge: HOME | End: 2024-02-23
Payer: MEDICARE

## 2024-02-23 DIAGNOSIS — R10.2 VAGINAL PAIN: ICD-10-CM

## 2024-02-23 DIAGNOSIS — R19.8 ALTERNATING CONSTIPATION AND DIARRHEA: ICD-10-CM

## 2024-02-23 DIAGNOSIS — R10.30 LOWER ABDOMINAL PAIN: ICD-10-CM

## 2024-02-23 DIAGNOSIS — R10.824 LEFT LOWER QUADRANT ABDOMINAL TENDERNESS WITH REBOUND TENDERNESS: ICD-10-CM

## 2024-02-23 PROCEDURE — 74176 CT ABD & PELVIS W/O CONTRAST: CPT

## 2024-02-23 RX ADMIN — IOHEXOL 50 ML: 240 INJECTION, SOLUTION INTRATHECAL; INTRAVASCULAR; INTRAVENOUS; ORAL at 10:39

## 2024-02-26 DIAGNOSIS — R60.0 BILATERAL LEG EDEMA: ICD-10-CM

## 2024-02-26 DIAGNOSIS — Z86.39 HISTORY OF HYPOKALEMIA: ICD-10-CM

## 2024-02-26 DIAGNOSIS — Z79.899 LONG TERM CURRENT USE OF DIURETIC: ICD-10-CM

## 2024-02-29 RX ORDER — FUROSEMIDE 20 MG/1
TABLET ORAL
Qty: 10 TABLET | Refills: 0 | Status: SHIPPED | OUTPATIENT
Start: 2024-02-29

## 2024-02-29 RX ORDER — POTASSIUM CHLORIDE 750 MG/1
TABLET, FILM COATED, EXTENDED RELEASE ORAL
Qty: 10 TABLET | Refills: 0 | Status: SHIPPED | OUTPATIENT
Start: 2024-02-29

## 2024-03-05 DIAGNOSIS — R60.0 BILATERAL LEG EDEMA: ICD-10-CM

## 2024-03-05 DIAGNOSIS — Z79.899 LONG TERM CURRENT USE OF DIURETIC: ICD-10-CM

## 2024-03-05 DIAGNOSIS — Z86.39 HISTORY OF HYPOKALEMIA: ICD-10-CM

## 2024-03-07 RX ORDER — FUROSEMIDE 20 MG/1
TABLET ORAL
Qty: 10 TABLET | Refills: 0 | Status: SHIPPED | OUTPATIENT
Start: 2024-03-07 | End: 2024-03-13

## 2024-03-07 RX ORDER — POTASSIUM CHLORIDE 750 MG/1
TABLET, FILM COATED, EXTENDED RELEASE ORAL
Qty: 10 TABLET | Refills: 0 | Status: SHIPPED | OUTPATIENT
Start: 2024-03-07 | End: 2024-03-13

## 2024-03-12 DIAGNOSIS — Z79.899 LONG TERM CURRENT USE OF DIURETIC: ICD-10-CM

## 2024-03-12 DIAGNOSIS — Z86.39 HISTORY OF HYPOKALEMIA: ICD-10-CM

## 2024-03-12 DIAGNOSIS — R60.0 BILATERAL LEG EDEMA: ICD-10-CM

## 2024-03-12 NOTE — TELEPHONE ENCOUNTER
Requested medication(s) are due for refill today:   Patient has already received a courtesy refill:   Other reason request has been forwarded to provider: med was signed and dispensed for a 10 day supply

## 2024-03-13 RX ORDER — POTASSIUM CHLORIDE 750 MG/1
TABLET, FILM COATED, EXTENDED RELEASE ORAL
Qty: 10 TABLET | Refills: 0 | Status: SHIPPED | OUTPATIENT
Start: 2024-03-13 | End: 2024-03-14 | Stop reason: SDUPTHER

## 2024-03-13 RX ORDER — FUROSEMIDE 20 MG/1
TABLET ORAL
Qty: 10 TABLET | Refills: 0 | Status: SHIPPED | OUTPATIENT
Start: 2024-03-13 | End: 2024-03-14 | Stop reason: SDUPTHER

## 2024-03-13 NOTE — TELEPHONE ENCOUNTER
Please have pt schedule appt with me given that she is using lasix quite frequently, and her cardiologist f/u appt is not until late July February 20, 2024  Sarika Wheeler   to Hanna Hernandez   KL      2/20/24 12:23 PM  Dr.Manzella Citlali said that it is not appropriate to increase your hydrochlorothiazide to 25mg as you are already on an additional hydrochlorothiazide in the Ziac pill (bisoprolol/hydrochlorothiazide) that your cardiologist prescribes.      She said she can prescribe a small dose of Lasix to take as needed for the leg swelling, but you will also need to take a prescription potassium supplement pill with each dose of the Lasix, as your potassium was low at the ER in October and prior to that.     Lastly, you will need to discuss this leg swelling with your cardiologist at your follow up appointment with them.     If you have any further questions or concerns please do not hesitate to message us on [employer] or by calling the office at [phone].     Thanks,  Sarika    Last read by Hanna Hernandez at 10:34 AM on 2/29/2024.     none

## 2024-03-14 ENCOUNTER — OFFICE VISIT (OUTPATIENT)
Dept: FAMILY MEDICINE CLINIC | Facility: CLINIC | Age: 64
End: 2024-03-14
Payer: MEDICARE

## 2024-03-14 VITALS
TEMPERATURE: 99.7 F | HEART RATE: 72 BPM | DIASTOLIC BLOOD PRESSURE: 80 MMHG | HEIGHT: 63 IN | BODY MASS INDEX: 46.56 KG/M2 | SYSTOLIC BLOOD PRESSURE: 130 MMHG | OXYGEN SATURATION: 94 % | WEIGHT: 262.8 LBS

## 2024-03-14 DIAGNOSIS — R60.0 BILATERAL LEG EDEMA: Primary | ICD-10-CM

## 2024-03-14 DIAGNOSIS — E11.9 TYPE 2 DIABETES MELLITUS WITHOUT COMPLICATION, WITHOUT LONG-TERM CURRENT USE OF INSULIN (HCC): ICD-10-CM

## 2024-03-14 DIAGNOSIS — Z86.39 HISTORY OF HYPOKALEMIA: ICD-10-CM

## 2024-03-14 DIAGNOSIS — R50.9 LOW GRADE FEVER: ICD-10-CM

## 2024-03-14 DIAGNOSIS — R09.81 NASAL CONGESTION: ICD-10-CM

## 2024-03-14 DIAGNOSIS — Z79.899 LONG TERM CURRENT USE OF DIURETIC: ICD-10-CM

## 2024-03-14 DIAGNOSIS — I89.0 LYMPHEDEMA: ICD-10-CM

## 2024-03-14 LAB — SL AMB POCT HEMOGLOBIN AIC: 6.6 (ref ?–6.5)

## 2024-03-14 PROCEDURE — 99214 OFFICE O/P EST MOD 30 MIN: CPT | Performed by: FAMILY MEDICINE

## 2024-03-14 PROCEDURE — 83036 HEMOGLOBIN GLYCOSYLATED A1C: CPT | Performed by: FAMILY MEDICINE

## 2024-03-14 RX ORDER — DIAZEPAM 10 MG/1
TABLET ORAL
COMMUNITY
Start: 2024-02-28

## 2024-03-14 RX ORDER — FUROSEMIDE 20 MG/1
20 TABLET ORAL DAILY
Qty: 30 TABLET | Refills: 1 | Status: SHIPPED | OUTPATIENT
Start: 2024-03-14

## 2024-03-14 RX ORDER — METRONIDAZOLE 500 MG/1
TABLET ORAL
COMMUNITY
Start: 2024-03-05

## 2024-03-14 RX ORDER — POTASSIUM CHLORIDE 750 MG/1
10 TABLET, FILM COATED, EXTENDED RELEASE ORAL DAILY
Qty: 30 TABLET | Refills: 1 | Status: SHIPPED | OUTPATIENT
Start: 2024-03-14

## 2024-03-14 RX ORDER — AMOXICILLIN AND CLAVULANATE POTASSIUM 875; 125 MG/1; MG/1
1 TABLET, FILM COATED ORAL EVERY 12 HOURS SCHEDULED
Qty: 14 TABLET | Refills: 0 | Status: SHIPPED | OUTPATIENT
Start: 2024-03-14 | End: 2024-03-21

## 2024-03-14 RX ORDER — CEPHALEXIN 500 MG/1
500 CAPSULE ORAL 4 TIMES DAILY
COMMUNITY
Start: 2024-03-07 | End: 2024-03-14

## 2024-03-14 RX ORDER — CLINDAMYCIN PHOSPHATE 20 MG/G
1 CREAM VAGINAL
COMMUNITY
Start: 2024-03-08 | End: 2024-03-15

## 2024-03-14 NOTE — PATIENT INSTRUCTIONS
Patient's shoes and socks removed.Patient's shoes and socks were not removed.      Right Foot        Inspection      []NormalNormal []Foot AmputationFoot Amputation           Skin      +  skin intact  -   +  erythema  -      +  dry skin  -   +  maceration  -      +  warmth  -   +  color abnormal  -      +  pre-ulcer  -   Size (cm)              +  ulcer  -   Size (cm)              +  callus  -   Size (cm)                      Toes    []ROM and Strength WNLROM and Strength WNL    +  swelling  -   +  tenderness  -      +  erythema  -   +  deformity  -              Sensory    Vibration    absent  diminished  intact          Proprioception Right    absent  diminished  intact          Monofilament Testing    absent  diminished  intact                  Vascular    Right Capillary Refills    < 3 seconds  elevated          Pulses:      Dorsalis pedis    0  1+  2+          Posterior tibial    0  1+  2+                   Left Foot        Inspection      []NormalNormal []Foot AmputationFoot Amputation           Skin      +  skin intact  -   +  erythema  -      +  dry skin  -   +  maceration  -      +  warmth  -   +  color abnormal  -      +  pre-ulcer  -   Size (cm)              +  ulcer  -   Size (cm)              +  callus  -   Size (cm)                      Toes    []ROM and Strength WNLROM and Strength WNL    +  swelling  -   +  tenderness  -      +  erythema  -   +  deformity  -              Sensory    Vibration    absent  diminished  intact          Proprioception Right    absent  diminished  intact          Monofilament Testing    absent  diminished  intact                  Vascular    Left Capillary Refills    < 3 seconds  elevated          Pulses:      Dorsalis pedis    0  1+  2+          Posterior tibial    0  1+  2+                    More      Assign Risk Category        Deformity PresentNo Deformity    Loss of Protective SensationNo Loss of Protective Sensation    Weak PulsesNo Weak Pulses    Risk  Category    0  1  2  3      Risk Category 0 - Normal Plantar Sensation - Low Risk      Risk Category 1 - Loss of Protective Sensation (LOPS) - Moderate Risk      Risk Category 2 - LOPS with weak pulses/PAD or Structural Foot Deformities or Onychomycosis - High Risk      Risk Category 3 - History of Ulceration, Amputation, or Neuropathic Fracture - Very High Risk        []Diabetic Foot Exam Performed (requires shoes/socks removed + risk category assigned)Diabetic Foot Exam Performed (requires shoes/socks removed + risk category assigned)

## 2024-03-14 NOTE — PROGRESS NOTES
Name: Hanna Hernandez      : 1960      MRN: [id number]  Encounter Provider: Micki Pool DO  Encounter Date: 3/14/2024   Encounter department: [facility] [place]    Assessment & Plan     1. Bilateral leg edema  -      VAS VENOUS DUPLEX - LOWER LIMB BILATERAL; Future; Expected date: 2024  -     furosemide (LASIX) 20 mg tablet; Take 1 tablet (20 mg total) by mouth daily    2. Lymphedema    3. Type 2 diabetes mellitus without complication, without long-term current use of insulin ([facility])  -     POCT hemoglobin A1c    4. Long term current use of diuretic  -     potassium chloride (Klor-Con) 10 mEq tablet; Take 1 tablet (10 mEq total) by mouth daily    5. History of hypokalemia  -     potassium chloride (Klor-Con) 10 mEq tablet; Take 1 tablet (10 mEq total) by mouth daily    6. Low grade fever  -     amoxicillin-clavulanate (AUGMENTIN) 875-125 mg per tablet; Take 1 tablet by mouth every 12 (twelve) hours for 7 days    7. Nasal congestion  -     amoxicillin-clavulanate (AUGMENTIN) 875-125 mg per tablet; Take 1 tablet by mouth every 12 (twelve) hours for 7 days    Recent much worse LE edema   Doubtful that pt's LE is cardiac related- she just had normal myocardial perfusion nuclear stress last month ordered by her cardiologist and had normal systolic and diastolic function on echo in October; has not had any recent CP's  Will workup for venous insufficiency; pt reports today that she has lymphedema -  states lymphedema was dx by her previous PCP and she started lymphedema PT prior to establishing care here in November   For now continue lasix +potassium       Subjective      Chief Complaint   Patient presents with    Medication Management     F/u from [employer] message- Discuss Lasix       Today's appt was scheduled due to pt requesting refills of lasix that had been rx'd as PRN, and clear that pt was using the lasix every day   Admits has had to use the lasix daily due to LE edema, took dose  "this morning and I can see during HPI that she still has about 1+ b/l edema- pt admits that the lasix does not completely take it away  Temp elev on rooming- admits has had nasal acacia for the past week but she saw ENT yesterday and he said everything was fine - it is hot outside today  Pt is currently on keflex since 3/7/24 rx'd by the ER doctor \"for the cellulitis in my eye\"  Temp even higher on recheck 100.1 - I advise pt that I will escribe augmentin to better cover possible sinus infection- she is grateful tiffanie because having surgery and didn't want to have to postpone  Has compression socks, \"but therapist said they weren't the right kind, they needed to be fitted- go to therapy on Monday, so can ask them\"  Pt then states that she was dx with lymphedema many months ago and has been seeing lymphedema PT. I advise pt that lymphedema is not on her list of current or past medical problems; pt states was dx by her previous PCP and she started the lymphedema PT prior to establishing care here in November   Per chart review, pt had echo in October last year which showed normal systolic and diastolic function:  She also had normal nuclear stress test/PET CT myocardial perfusion test last month ordered by her cardiologist at [facility], Dr. Eastman  She has had no CP's and denies SOB  She has excellent renal function on labs last week  No venous dopplers on record - she did have LE arterial doppler testing in October last year      ECHO 2D COMPLETE DOPPLER AND COLOR FLOW WITH CONTRAST  Exam End: 10/11/23 10:19 AM Last Resulted: 10/11/23 12:04 PM  Received From: [facility]   Narrative  This result has an attachment that is not available.  ·  Left Ventricle: Systolic function is normal with an ejection fraction  of 60-65%.  ·  Left Atrium: Left atrium cavity size is upper normal.  ·  Mitral Valve: There is mild posterior annular calcification.  Left Ventricle  Left ventricle is normal in size. Wall thickness " is normal. Systolic function is normal with an ejection fraction of 60-65%. Wall motion is within normal limits. There is normal diastolic function.          All Conversations: Medication Refill  (Oldest Message First)  March 12, 2024      3/12/24  9:28 AM  Interface, Surescripts In routed this conversation to [facility]  Sherlyn Benitez   to [facility]   SD    3/12/24 10:02 AM  Please review to see if the refill is appropriate.  Sarika Wheeler   to [facility]    3/12/24 10:18 AM  Note      Requested medication(s) are due for refill today:   Patient has already received a courtesy refill:   Other reason request has been forwarded to provider: med was signed and dispensed for a 10 day supply  March 13, 2024  Me    3/13/24  4:30 PM  Note      Please have pt schedule appt with me given that she is using lasix quite frequently, and her cardiologist f/u appt is not until late July February 20, 2024  Sarika Wheeler   to Hanna Hernandez       2/20/24 12:23 PM  Dr.Manzella Citlali said that it is not appropriate to increase your hydrochlorothiazide to 25mg as you are already on an additional hydrochlorothiazide in the Ziac pill (bisoprolol/hydrochlorothiazide) that your cardiologist prescribes.   She said she can prescribe a small dose of Lasix to take as needed for the leg swelling, but you will also need to take a prescription potassium supplement pill with each dose of the Lasix, as your potassium was low at the ER in October and prior to that.  Lastly, you will need to discuss this leg swelling with your cardiologist at your follow up appointment with them.  If you have any further questions or concerns please do not hesitate to message us on [employer] or by calling the office at [phone].  Thanks,  Sarika  Last read by Hanna Hernandez at 10:34 AM on 2/29/2024.    3/13/24  4:30 PM  You routed this conversation to [facility] · [facility]  Practice Clerical  [place], [employer]   to Hanna Hernandez       3/13/24  4:30 PM  Dear Hanna,  The following medication renewals have been approved and sent to the pharmacy:  The following prescriptions will be filled at [place] [address] [[phone]]:   - potassium chloride (Klor-Con) 10 mEq tablet   - furosemide (LASIX) 20 mg tablet      STRESS TEST RB82 PHARMACOLOGICAL (FOR PET)  Order: [id number]  Narrative  This result has an attachment that is not available.  1. No abnormal ST/T changes with pharmacologic stress.  2. Successful administration of pharmacologic stress.  3. Nuclear perfusion imaging to follow and to be reported separately by  Radiology.  Resting ECG  ECG is normal. Resting ECG shows no ST-segment deviation. The ECG shows normal sinus rhythm.  Stress Findings  A pharmacological stress test was performed using 0.4 mg of regadenoson IV push over 10 seconds.The patient had a maximal HR of 94 bpm (59% of MPHR) METS. The patient reached the end of the protocol. The patient reported no symptoms during the stress test. Blood pressure demonstrated a normal response and heart rate demonstrated a normal response to stress. The patient's heart rate recovery was normal.  Stress ECG  No ST deviation was noted. There were no arrhythmias during stress. There were no arrhythmias during recovery.  All Measurements  Exam End: 02/06/24 12:16 PM Last Resulted: 02/06/24  1:38 PM  [facility]  Outside Information  Results  PET CT RB82 MYOCARDIAL PERFUSION (Order [id number])   suggestion  Information displayed in this report may not trend or trigger automated decision support.  PET CT RB82 MYOCARDIAL PERFUSION  Order: [id number]  Impression  IMPRESSION:  1. Normal LV perfusion without ischemia or infarct.  2. Normal coronary flow reserve at 2.7.  3. Normal LV volume. Normal LV wall motion and global function.  The PET/CT study is joint read with Dr. Tono Naranjo  from Cardiology. Please see  separate report for EKG portion dictated separately by Cardiology.             ECHO 2D COMPLETE DOPPLER AND COLOR FLOW WITH CONTRAST  Order: [id number]  Narrative  This result has an attachment that is not available.  ·  Left Ventricle: Systolic function is normal with an ejection fraction  of 60-65%.  ·  Left Atrium: Left atrium cavity size is upper normal.  ·  Mitral Valve: There is mild posterior annular calcification.  Left Ventricle  Left ventricle is normal in size. Wall thickness is normal. Systolic function is normal with an ejection fraction of 60-65%. Wall motion is within normal limits. There is normal diastolic function.  Right Ventricle  Right ventricle cavity is normal. Systolic function is normal.  Left Atrium  Left atrium cavity size is upper normal.  Right Atrium  Right atrium cavity is normal.  IVC/SVC  The inferior vena cava demonstrates a diameter of <=21 mm and collapses >50%; therefore, the right atrial pressure is estimated at 0-5 mmHg.  Mitral Valve  Mitral valve structure is normal. There is mild posterior annular calcification. There is no regurgitation or stenosis.  Tricuspid Valve  Tricuspid valve structure is normal. There is trace regurgitation. There is no evidence of tricuspid valve stenosis.  Aortic Valve  The aortic valve is trileaflet. There is no regurgitation or stenosis.  Pulmonic Valve  Pulmonic valve structure is normal. There is no regurgitation or stenosis.  Ascending Aorta  The aorta appears normal in size.  Pericardium  There is no pericardial effusion.  Study Details  A complete 2D echocardiogram was performed. Color flow, Pulse Wave and Continuous Wave Doppler was performed and analyzed.Definity contrast was used during the study. Overall the study quality was poor. The study had technical difficulties. The study was difficult due to patient's body habitus.  Vitals  The EF by visual approximation is 60 %.  All Measurements  Exam End: 10/11/23  10:19 AM Last Resulted: 10/11/23 12:04 PM  Received From: [facility]                   Review of Systems    Current Outpatient Medications on File Prior to Visit   Medication Sig    ascorbic acid (VITAMIN C) 250 mg tablet Take 500 mg by mouth 2 (two) times a day    bisoprolol-hydrochlorothiazide (ZIAC) 10-6.25 MG per tablet Take 1 tablet by mouth every morning    cephalexin (KEFLEX) 500 mg capsule Take 500 mg by mouth 4 (four) times a day    Cholecalciferol (D3-1000 PO) Take 5,000 Int'l Units/day by mouth in the morning    Cleocin 2 % vaginal cream Insert 1 applicator into the vagina    diazepam (VALIUM) 10 mg tablet     escitalopram (LEXAPRO) 5 mg tablet Take 20 mg by mouth    hydrochlorothiazide (HYDRODIURIL) 12.5 mg tablet Take 1 tablet (12.5 mg total) by mouth daily    HYDROcodone-acetaminophen (NORCO) 5-325 mg per tablet TAKE 1-2 TABLETS BY MOUTH EVERY 12 (TWELVE) HOURS AS NEEDED FOR PAIN. MAX DAILY AMOUNT: 4 TABLETS.    MAGNESIUM PO Take by mouth    metFORMIN (GLUCOPHAGE-XR) 750 mg 24 hr tablet     methocarbamol (ROBAXIN) 750 mg tablet     metroNIDAZOLE (FLAGYL) 500 mg tablet     omeprazole (PriLOSEC) 20 mg delayed release capsule Take 1 capsule (20 mg total) by mouth daily    Red Yeast Rice Extract 600 MG TABS Take by mouth    Zinc Acetate, Oral, (ZINC ACETATE PO) Take by mouth    Zinc Gluconate 50 MG CAPS Take 50 mg by mouth daily    [DISCONTINUED] furosemide (LASIX) 20 mg tablet TAKE ONE TABLET (20 MG TOTAL) BY MOUTH DAILY AS NEEDED (LEG EDEMA)    [DISCONTINUED] potassium chloride (Klor-Con) 10 mEq tablet TAKE ONE TABLET (10 MEQ TOTAL) BY MOUTH DAILY AS NEEDED (USE OF LASIX) TAKE WITH DOSE OF LASIX WHEN NEEDED    DULoxetine (CYMBALTA) 60 mg delayed release capsule 30 MG a day for two weeks, then 30 MG every other day for two weeks. (Patient not taking: Reported on 2/22/2024)       Objective     /80 (BP Location: Left arm, Patient Position: Sitting, Cuff Size: Standard)   Pulse 72    "Temp 99.7 °F (37.6 °C) (Tympanic)   Ht 5' 2.5\" (1.588 m)   Wt 119 kg (262 lb 12.8 oz)   SpO2 94%   BMI 47.30 kg/m²     Physical Exam  Vitals and nursing note reviewed.   Constitutional:       General: She is not in acute distress.     Appearance: She is well-groomed. She is not ill-appearing, toxic-appearing or diaphoretic.   HENT:      Head: Normocephalic and atraumatic.      Right Ear: Tympanic membrane and ear canal normal.      Left Ear: Tympanic membrane and ear canal normal.      Nose: Congestion present.      Mouth/Throat:      Lips: Pink.      Mouth: Mucous membranes are moist.      Pharynx: Oropharynx is clear. Uvula midline.   Neck:      Thyroid: No thyroid mass, thyromegaly or thyroid tenderness.      Vascular: No JVD.      Trachea: Trachea and phonation normal.   Cardiovascular:      Rate and Rhythm: Normal rate and regular rhythm.      Pulses: Normal pulses.      Heart sounds: Normal heart sounds.   Pulmonary:      Effort: Pulmonary effort is normal.      Breath sounds: Normal breath sounds and air entry.   Musculoskeletal:      Cervical back: Neck supple.      Right lower le+ Edema present.      Left lower le+ Edema present.   Lymphadenopathy:      Cervical: No cervical adenopathy.   Skin:     General: Skin is warm and dry.      Coloration: Skin is not pale.   Neurological:      Mental Status: She is alert and oriented to person, place, and time.   Psychiatric:         Behavior: Behavior is cooperative.               Micki Pool DO    "

## 2024-03-18 ENCOUNTER — HOSPITAL ENCOUNTER (OUTPATIENT)
Dept: NON INVASIVE DIAGNOSTICS | Facility: HOSPITAL | Age: 64
Discharge: HOME | End: 2024-03-18
Payer: MEDICARE

## 2024-03-18 DIAGNOSIS — R60.0 BILATERAL LEG EDEMA: ICD-10-CM

## 2024-03-18 PROCEDURE — 93970 EXTREMITY STUDY: CPT | Performed by: SURGERY

## 2024-03-18 PROCEDURE — 93970 EXTREMITY STUDY: CPT

## 2024-03-21 ENCOUNTER — TELEPHONE (OUTPATIENT)
Dept: FAMILY MEDICINE CLINIC | Facility: CLINIC | Age: 64
End: 2024-03-21

## 2024-03-21 NOTE — TELEPHONE ENCOUNTER
"If pt calls back please relay lab results per :    \"Please advise pt that venous duplex shows that her leg veins are normal - lymphedema is the problem. Could resume lymphedema PT if not ongoing currently \"  "

## 2024-04-13 DIAGNOSIS — R60.0 BILATERAL LEG EDEMA: ICD-10-CM

## 2024-04-13 DIAGNOSIS — Z86.39 HISTORY OF HYPOKALEMIA: ICD-10-CM

## 2024-04-13 DIAGNOSIS — Z79.899 LONG TERM CURRENT USE OF DIURETIC: ICD-10-CM

## 2024-04-13 RX ORDER — FUROSEMIDE 20 MG/1
20 TABLET ORAL DAILY
Qty: 30 TABLET | Refills: 1 | Status: SHIPPED | OUTPATIENT
Start: 2024-04-13

## 2024-04-13 RX ORDER — POTASSIUM CHLORIDE 750 MG/1
10 TABLET, FILM COATED, EXTENDED RELEASE ORAL DAILY
Qty: 30 TABLET | Refills: 1 | Status: SHIPPED | OUTPATIENT
Start: 2024-04-13

## 2024-04-18 ENCOUNTER — APPOINTMENT (OUTPATIENT)
Dept: LAB | Facility: CLINIC | Age: 64
End: 2024-04-18
Payer: MEDICARE

## 2024-04-18 DIAGNOSIS — R19.4 CHANGE IN BOWEL HABITS: ICD-10-CM

## 2024-04-18 DIAGNOSIS — E65 CENTRAL OBESITY: ICD-10-CM

## 2024-04-18 DIAGNOSIS — E55.9 VITAMIN D DEFICIENCY: ICD-10-CM

## 2024-04-18 DIAGNOSIS — E11.9 TYPE 2 DIABETES MELLITUS WITHOUT COMPLICATION, WITHOUT LONG-TERM CURRENT USE OF INSULIN (HCC): ICD-10-CM

## 2024-04-18 DIAGNOSIS — I10 PRIMARY HYPERTENSION: ICD-10-CM

## 2024-04-18 LAB
25(OH)D3 SERPL-MCNC: 65.3 NG/ML (ref 30–100)
ALBUMIN SERPL BCP-MCNC: 4.1 G/DL (ref 3.5–5)
ALP SERPL-CCNC: 61 U/L (ref 34–104)
ALT SERPL W P-5'-P-CCNC: 18 U/L (ref 7–52)
ANION GAP SERPL CALCULATED.3IONS-SCNC: 9 MMOL/L (ref 4–13)
AST SERPL W P-5'-P-CCNC: 12 U/L (ref 13–39)
BILIRUB SERPL-MCNC: 0.51 MG/DL (ref 0.2–1)
BUN SERPL-MCNC: 17 MG/DL (ref 5–25)
CALCIUM SERPL-MCNC: 9.1 MG/DL (ref 8.4–10.2)
CHLORIDE SERPL-SCNC: 104 MMOL/L (ref 96–108)
CHOLEST SERPL-MCNC: 159 MG/DL
CO2 SERPL-SCNC: 27 MMOL/L (ref 21–32)
CREAT SERPL-MCNC: 0.63 MG/DL (ref 0.6–1.3)
CREAT UR-MCNC: 121.7 MG/DL
GFR SERPL CREATININE-BSD FRML MDRD: 95 ML/MIN/1.73SQ M
GLUCOSE P FAST SERPL-MCNC: 113 MG/DL (ref 65–99)
HDLC SERPL-MCNC: 48 MG/DL
IGA SERPL-MCNC: 184 MG/DL (ref 66–433)
LDLC SERPL CALC-MCNC: 83 MG/DL (ref 0–100)
MICROALBUMIN UR-MCNC: 8.6 MG/L
MICROALBUMIN/CREAT 24H UR: 7 MG/G CREATININE (ref 0–30)
POTASSIUM SERPL-SCNC: 3.8 MMOL/L (ref 3.5–5.3)
PROT SERPL-MCNC: 6.8 G/DL (ref 6.4–8.4)
SODIUM SERPL-SCNC: 140 MMOL/L (ref 135–147)
TRIGL SERPL-MCNC: 142 MG/DL

## 2024-04-18 PROCEDURE — 80053 COMPREHEN METABOLIC PANEL: CPT

## 2024-04-18 PROCEDURE — 82306 VITAMIN D 25 HYDROXY: CPT

## 2024-04-18 PROCEDURE — 82043 UR ALBUMIN QUANTITATIVE: CPT

## 2024-04-18 PROCEDURE — 83036 HEMOGLOBIN GLYCOSYLATED A1C: CPT

## 2024-04-18 PROCEDURE — 86364 TISS TRNSGLTMNASE EA IG CLAS: CPT

## 2024-04-18 PROCEDURE — 36415 COLL VENOUS BLD VENIPUNCTURE: CPT

## 2024-04-18 PROCEDURE — 82570 ASSAY OF URINE CREATININE: CPT

## 2024-04-18 PROCEDURE — 80061 LIPID PANEL: CPT

## 2024-04-18 PROCEDURE — 82784 ASSAY IGA/IGD/IGG/IGM EACH: CPT

## 2024-04-19 ENCOUNTER — APPOINTMENT (OUTPATIENT)
Dept: LAB | Facility: CLINIC | Age: 64
End: 2024-04-19
Payer: MEDICARE

## 2024-04-19 LAB
EST. AVERAGE GLUCOSE BLD GHB EST-MCNC: 148 MG/DL
HBA1C MFR BLD: 6.8 %

## 2024-04-20 LAB — TTG IGA SER-ACNC: <2 U/ML (ref 0–3)

## 2024-05-22 ENCOUNTER — TELEPHONE (OUTPATIENT)
Age: 64
End: 2024-05-22

## 2024-05-22 DIAGNOSIS — Z79.899 LONG TERM CURRENT USE OF DIURETIC: ICD-10-CM

## 2024-05-22 DIAGNOSIS — Z86.39 HISTORY OF HYPOKALEMIA: ICD-10-CM

## 2024-05-22 DIAGNOSIS — I10 HYPERTENSION, BENIGN: ICD-10-CM

## 2024-05-22 RX ORDER — HYDROCHLOROTHIAZIDE 12.5 MG/1
TABLET ORAL
Qty: 30 TABLET | Refills: 5 | Status: SHIPPED | OUTPATIENT
Start: 2024-05-22

## 2024-05-22 NOTE — TELEPHONE ENCOUNTER
Patient wanted you to know that [facility] billing office told the patient that the eye exam is never included in the welcome to medicare visit.

## 2024-05-23 RX ORDER — POTASSIUM CHLORIDE 750 MG/1
10 TABLET, FILM COATED, EXTENDED RELEASE ORAL DAILY
Qty: 30 TABLET | Refills: 0 | Status: SHIPPED | OUTPATIENT
Start: 2024-05-23

## 2024-05-24 ENCOUNTER — OFFICE VISIT (OUTPATIENT)
Dept: ENDOCRINOLOGY | Facility: CLINIC | Age: 64
End: 2024-05-24
Payer: MEDICARE

## 2024-05-24 VITALS
BODY MASS INDEX: 46.54 KG/M2 | DIASTOLIC BLOOD PRESSURE: 70 MMHG | WEIGHT: 258.6 LBS | HEART RATE: 61 BPM | OXYGEN SATURATION: 98 % | TEMPERATURE: 98.1 F | SYSTOLIC BLOOD PRESSURE: 136 MMHG

## 2024-05-24 DIAGNOSIS — I10 HYPERTENSION, BENIGN: ICD-10-CM

## 2024-05-24 DIAGNOSIS — E66.9 TYPE 2 DIABETES MELLITUS WITH OBESITY  (HCC): ICD-10-CM

## 2024-05-24 DIAGNOSIS — E66.01 MORBID OBESITY (HCC): ICD-10-CM

## 2024-05-24 DIAGNOSIS — E11.9 TYPE 2 DIABETES MELLITUS WITHOUT COMPLICATION, WITHOUT LONG-TERM CURRENT USE OF INSULIN (HCC): Primary | ICD-10-CM

## 2024-05-24 DIAGNOSIS — E11.69 TYPE 2 DIABETES MELLITUS WITH OBESITY  (HCC): ICD-10-CM

## 2024-05-24 PROCEDURE — 99214 OFFICE O/P EST MOD 30 MIN: CPT | Performed by: STUDENT IN AN ORGANIZED HEALTH CARE EDUCATION/TRAINING PROGRAM

## 2024-05-24 RX ORDER — METFORMIN HYDROCHLORIDE 500 MG/1
500 TABLET, EXTENDED RELEASE ORAL 2 TIMES DAILY WITH MEALS
Qty: 180 TABLET | Refills: 0 | Status: SHIPPED | OUTPATIENT
Start: 2024-05-24 | End: 2024-08-22

## 2024-05-24 NOTE — ASSESSMENT & PLAN NOTE
Lab Results   Component Value Date    HGBA1C 6.8 (H) 04/18/2024   Diabetes is controlled, with A1c of 6.8%, her SMBG indicates that her blood sugar has been higher which is likely due to several corticosteroid intra-articular injections or arthralgia.  She has stopped taking metformin, due to GI symptoms that she has been experiencing, she reports diarrhea, and nausea, discontinuation of metformin did not change her symptoms, she has been evaluated by gastroenterology team, a MRI abdomen showed Unilocular cystic observation within the pancreatic head measuring 0.6   x 0.6 cm favored represent an indolent lesion of epithelial origin. No main, pancreatic duct dilation. 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   Giving above findings, I deferred starting GLP-1, GLP-1-GIP dual agonist,  She is agreeable to resume metformin with lower dose, which started metformin extended release 500 mg daily.  Given NAFLD, thiazolidinendions are options, she is not willing to start now, will consider if blood sugars are going higher.  She was instructed to check her blood sugar once or twice a day and bring her sugar log to next visit.  Labs also modification including regular daily exercise and balanced diet was encouraged.  She was referred to CDE for MNT.

## 2024-05-24 NOTE — PROGRESS NOTES
Established patient Progress Note      Cc: diabetes    Referring Provider  No referring provider defined for this encounter.     History of Present Illness:   Hanna Hernandez is a 63 y.o. female with a history of type 2 diabetes without long term use of insulin who presented for follow up.        Reports complications of none. Denies recent illness or hospitalizations.    Current regimen:   Metformin  mg daily, ( she discontinued few days ago, due to abdominal discomfort and diarrhea ).     She reports several cortisol shots for joint pain,    SMBG :  1- 2,   Blood sugars ranging from 100 to 160 mg/dl      Opthamology:  UTD;   Podiatry: no    on ACE inhibitor or ARB: ZIAC 10 -6.25 mg daily   on statin: no    Thyroid disorders: no      Patient Active Problem List   Diagnosis    Anxiety    Hypertension, benign    Morbid obesity (HCC)    GERD without esophagitis    Family hx of colon cancer    SIXTO on CPAP    PONV (postoperative nausea and vomiting)    Back pain    Slow transit constipation    Type 2 diabetes mellitus without complication (HCC)    Depression due to physical illness    Continuous opioid dependence (HCC)    Central obesity    Tremor    Chronic pain syndrome    Diarrhea    Vertigo    Spinal stenosis of lumbar region, unspecified whether neurogenic claudication present    Dysmetabolic syndrome X    Herniation of cervical intervertebral disc with radiculopathy    Lumbar foraminal stenosis    Lumbosacral spondylosis without myelopathy    Lumbar radiculopathy    Cushingoid facies    Type 2 diabetes mellitus with obesity  (HCC)    Bilateral leg edema    Lymphedema      Past Medical History:   Diagnosis Date    Anemia     Anusitis     history of    Anxiety     CAD (coronary artery disease) 2008    Colon polyp     Deep vein thrombosis (HCC)     Depression     Diabetes mellitus (HCC)      Diverticulosis 2006    Fatty liver     Fibrocystic breast     Fibromyalgia     Fibromyalgia, primary     GERD (gastroesophageal reflux disease)     H. pylori infection 12/1999    treated with Prilosec and Biaxin,  Recurrent H. pylori stool antigen positive 06/03 treated with Aciphex, amoxicillin and Biaxin unsuccessfully with recurrent positivity 12/03, treated Prilosec and Biaxin 01/04.  Positive breath test , 05/04 with Aciphex therapy used for 2 weeks 08/04.    History of DVT (deep vein thrombosis)     following knee replacement    History of panic attacks     Hyperlipidemia     Hypertension     Obesity     PONV (postoperative nausea and vomiting)     Thyroid nodule       Past Surgical History:   Procedure Laterality Date    BREAST SURGERY      CARDIAC SURGERY      cardiac cath    CARPAL TUNNEL RELEASE Right 03/2013    CHOLECYSTECTOMY  1984    COLONOSCOPY  2006    Diverticulosis    COLONOSCOPY  2011    2 hyperplastic polyps removed    COLONOSCOPY      COMBINED REDUCTION MAMMAPLASTY W/ ABDOMINOPLASTY  2005    FOOT SURGERY      HYSTERECTOMY      for prolapsed uterus with retention of ovaries    JOINT REPLACEMENT Left     TKR and revision    REPLACEMENT TOTAL KNEE      followed by DVT;  ANTIBIOTICS TO BE USED INDEFINITELY BEFORE DENTAL PROCEDURES.    US GUIDED THYROID BIOPSY  11/15/2017    WRIST SURGERY Right     x5      Family History   Problem Relation Age of Onset    Obesity Mother     Colon cancer Mother     Cancer Mother         colon cancer    Obesity Father     Heart disease Father     Diabetes Father     Depression Father     Hypertension Father     OCD Father     Obesity Sister     Diverticulitis Sister     Obesity Sister     Diverticulitis Sister     Other Sister         perforated colon    Colon polyps Sister     Obesity Brother     Colon polyps Brother     Colon polyps Maternal Grandmother     Stroke Maternal Grandfather     Obesity Paternal Grandmother     Stroke Paternal Grandmother     Heart disease  Paternal Grandmother     Obesity Paternal Grandfather     Stroke Paternal Grandfather     Heart disease Paternal Grandfather     Colon cancer Maternal Uncle      Social History     Tobacco Use    Smoking status: Former     Current packs/day: 0.00     Average packs/day: 1.5 packs/day for 5.0 years (7.5 ttl pk-yrs)     Types: Cigarettes     Start date: 1981     Quit date: 1986     Years since quittin.1    Smokeless tobacco: Never    Tobacco comments:     Quit at age 25   Substance Use Topics    Alcohol use: Not Currently     Allergies   Allergen Reactions    Codeine Other (See Comments)     ? stopped breathing, janine morphine 10/05      Iv Contrast [Iodinated Contrast Media] Shortness Of Breath    Other Shortness Of Breath    Ampicillin GI Intolerance     Dizzy, passed out      Cimetidine Other (See Comments)     Pain in stomach      Clam [employer] - Food Allergy Throat Swelling    Fentanyl GI Intolerance      janine Dilaudid      Only patch      Morphine GI Intolerance    Oxycodone-Acetaminophen Other (See Comments)     Hallucinations from Percocet      Metronidazole Rash    Tetracycline Rash         Current Outpatient Medications:     ascorbic acid (VITAMIN C) 250 mg tablet, Take 500 mg by mouth 2 (two) times a day, Disp: , Rfl:     bisoprolol-hydrochlorothiazide (ZIAC) 10-6.25 MG per tablet, Take 1 tablet by mouth every morning, Disp: , Rfl:     Cholecalciferol (D3-1000 PO), Take 5,000 Int'l Units/day by mouth in the morning, Disp: , Rfl:     diazepam (VALIUM) 10 mg tablet, , Disp: , Rfl:     DULoxetine (CYMBALTA) 60 mg delayed release capsule, 30 MG a day for two weeks, then 30 MG every other day for two weeks. (Patient not taking: Reported on 2024), Disp: , Rfl:     escitalopram (LEXAPRO) 5 mg tablet, Take 20 mg by mouth, Disp: , Rfl:     furosemide (LASIX) 20 mg tablet, TAKE 1 TABLET (20 MG TOTAL) BY MOUTH DAILY, Disp: 30 tablet, Rfl: 1    hydroCHLOROthiazide 12.5 mg tablet, TAKE ONE TABLET (12.5 MG  TOTAL) BY MOUTH DAILY, Disp: 30 tablet, Rfl: 5    HYDROcodone-acetaminophen (NORCO) 5-325 mg per tablet, TAKE 1-2 TABLETS BY MOUTH EVERY 12 (TWELVE) HOURS AS NEEDED FOR PAIN. MAX DAILY AMOUNT: 4 TABLETS., Disp: , Rfl:     MAGNESIUM PO, Take by mouth, Disp: , Rfl:     metFORMIN (GLUCOPHAGE-XR) 750 mg 24 hr tablet, , Disp: , Rfl:     methocarbamol (ROBAXIN) 750 mg tablet, , Disp: , Rfl:     metroNIDAZOLE (FLAGYL) 500 mg tablet, , Disp: , Rfl:     omeprazole (PriLOSEC) 20 mg delayed release capsule, Take 1 capsule (20 mg total) by mouth daily, Disp: 30 capsule, Rfl: 3    potassium chloride (Klor-Con) 10 mEq tablet, Take 1 tablet (10 mEq total) by mouth daily, Disp: 30 tablet, Rfl: 0    Red Yeast Rice Extract 600 MG TABS, Take by mouth, Disp: , Rfl:     Zinc Acetate, Oral, (ZINC ACETATE PO), Take by mouth, Disp: , Rfl:     Zinc Gluconate 50 MG CAPS, Take 50 mg by mouth daily, Disp: , Rfl:   Review of Systems   Constitutional:  Negative for appetite change, fatigue and unexpected weight change.   HENT:  Negative for trouble swallowing and voice change.    Eyes:  Negative for visual disturbance.   Respiratory:  Negative for cough, shortness of breath and wheezing.    Cardiovascular:  Negative for palpitations and leg swelling.   Gastrointestinal:  Positive for diarrhea. Negative for abdominal pain, constipation, nausea and vomiting.   Endocrine: Negative for cold intolerance, heat intolerance, polyphagia and polyuria.   Musculoskeletal:  Negative for arthralgias.   Skin:  Negative for color change, rash and wound.   Neurological:  Positive for dizziness, tremors and headaches. Negative for weakness, light-headedness and numbness.   Psychiatric/Behavioral:  Negative for agitation and sleep disturbance. The patient is not nervous/anxious.        Physical Exam:  There is no height or weight on file to calculate BMI.  There were no vitals taken for this visit.   Wt Readings from Last 3 Encounters:   03/14/24 119 kg (262 lb  "12.8 oz)   02/22/24 117 kg (257 lb 6.4 oz)   01/09/24 113 kg (249 lb 11.2 oz)       GEN: NAD  E/n/m nl facies,   Eyes: no stare or proptosis,   Neuro: no tremor,   Skin: warm and dry,   Ext:  no edema bilaterally,   Psych: nl mood and affect, no gross lapses in memory      Labs:   No components found for: \"HA1C\"  No components found for: \"GLU\"    Lab Results   Component Value Date    CREATININE 0.63 04/18/2024    CREATININE 0.63 03/07/2024    CREATININE 0.64 12/23/2023    BUN 17 04/18/2024    K 3.8 04/18/2024     04/18/2024    CO2 27 04/18/2024     GFR, Calculated   Date Value Ref Range Status   11/06/2020 88 >60 mL/min/1.73m2 Final     Comment:     mL/min per 1.73 square meters                                            Normal Function or Mild Renal    Disease (if clinically at risk):  >or=60  Moderately Decreased:                30-59  Severely Decreased:                  15-29  Renal Failure:                         <15                                            -American GFR: multiply reported GFR by 1.16    Please note that the eGFR is based on the CKD-EPI calculation, and is not intended to be used for drug dosing.                                            Note: Calculated GFR may not be an accurate indicator of renal function if the patient's renal function is not in a steady state.    Ordering Provider: ZBIGNIEW HERRERA  Report Copied to : [facility]     eGFRcr   Date Value Ref Range Status   03/07/2024 99 >59 Final     eGFR   Date Value Ref Range Status   04/18/2024 95 ml/min/1.73sq m Final     No components found for: \"MALBCRER\"    Lab Results   Component Value Date    HDL 48 (L) 04/18/2024    TRIG 142 04/18/2024       Lab Results   Component Value Date    ALT 18 04/18/2024    AST 12 (L) 04/18/2024    ALKPHOS 61 04/18/2024       Lab Results   Component Value Date    TSH 1.02 04/04/2023    FREET4 1.08 12/14/2022       Impression:  1. Type 2 diabetes mellitus without complication, without " long-term current use of insulin (HCC)    2. Hypertension, benign    3. Type 2 diabetes mellitus with obesity  (HCC)    4. Morbid obesity (HCC)           Plan:    Problem List Items Addressed This Visit          Cardiovascular and Mediastinum    Hypertension, benign     Blood pressure of 130/70, the goal less than 130/80, currently on Ziac 10-6.25 which will be continued            Endocrine    Type 2 diabetes mellitus without complication (HCC) - Primary       Lab Results   Component Value Date    HGBA1C 6.8 (H) 04/18/2024   Diabetes is controlled, with A1c of 6.8%, her SMBG indicates that her blood sugar has been higher which is likely due to several corticosteroid intra-articular injections or arthralgia.  She has stopped taking metformin, due to GI symptoms that she has been experiencing, she reports diarrhea, and nausea, discontinuation of metformin did not change her symptoms, she has been evaluated by gastroenterology team, a MRI abdomen showed Unilocular cystic observation within the pancreatic head measuring 0.6   x 0.6 cm favored represent an indolent lesion of epithelial origin. No main, pancreatic duct dilation. 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   Giving above findings, I deferred starting GLP-1, GLP-1-GIP dual agonist,  She is agreeable to resume metformin with lower dose, which started metformin extended release 500 mg daily.  Given NAFLD, thiazolidinendions are options, she is not willing to start now, will consider if blood sugars are going higher.  She was instructed to check her blood sugar once or twice a day and bring her sugar log to next visit.  Labs also modification including regular daily exercise and balanced diet was encouraged.  She was referred to CDE for MNT.           Relevant Medications    metFORMIN (GLUCOPHAGE-XR) 500 mg 24 hr tablet    Other Relevant Orders    Ambulatory referral to Diabetic Education    Hemoglobin A1C    Comprehensive metabolic panel     Lipid Panel with Direct LDL reflex    Type 2 diabetes mellitus with obesity  (HCC)    Relevant Medications    metFORMIN (GLUCOPHAGE-XR) 500 mg 24 hr tablet       Other    Morbid obesity (HCC)     See plan for type 2 diabetes.                  Discussed with the patient and all questioned fully answered. She will call me if any problems arise.    Counseled patient on diagnostic results, prognosis, risk and benefit of treatment options, instruction for management, importance of treatment compliance, Risk  factor reduction and impressions      Marilu Mercado MD

## 2024-05-24 NOTE — ASSESSMENT & PLAN NOTE
Blood pressure of 130/70, the goal less than 130/80, currently on Ziac 10-6.25 which will be continued

## 2024-05-28 ENCOUNTER — TELEPHONE (OUTPATIENT)
Dept: ADMINISTRATIVE | Facility: OTHER | Age: 64
End: 2024-05-28

## 2024-05-28 NOTE — TELEPHONE ENCOUNTER
Upon review of the In Basket request we were able to locate, review, and update the patient chart as requested for Diabetic Foot Exam.    Any additional questions or concerns should be emailed to the Practice Liaisons via the appropriate education email address, please do not reply via In Basket.    Thank you  Bobby Lisa MA

## 2024-05-28 NOTE — LETTER
Diabetic Foot Exam Form    Date Requested: 24  Patient: Hanna Hernandez  Patient : 1960   Referring Provider: Micki Pool DO    Diabetic Foot Exam Performed with shoes and socks removed        Yes         No     Date of Diabetic Foot Exam ______________________________  Risk Score ____________________________________________    Left Foot       Visual Inspection         Monofilament Testing Sensory Exam        Pedal Pulses         Additional Comments         Right Foot      Visual Inspection         Monofilament Testing Sensory Exam       Pedal Pulses         Additional Comments         Comments __________________________________________________________    Practice Providing Exam ______________________________________________    Exam Performed By (print name) _______________________________________      Provider Signature ___________________________________________________      These reports are needed for  compliance.    Please fax this completed form and a copy of the Diabetic Foot Exam report to our office located at [address] [place], [place] [address] as soon as possible via Fax [phone] attention Bobby: Phone [phone]    We thank you for your assistance in treating our mutual patient.

## 2024-05-28 NOTE — TELEPHONE ENCOUNTER
Upon review of the In Basket request and the patient's chart, initial outreach has been made via fax to facility. Please see Contacts section for details.     Thank you  Bobby Lisa MA

## 2024-05-28 NOTE — TELEPHONE ENCOUNTER
----- Message from Micki JAMIL sent at 5/24/2024  1:51 PM EDT -----  05/24/24 1:51 PM    Hello, our patient Hanna Hernandez has had Diabetic Foot Exam completed/performed. Please assist in updating the patient chart by pulling a previous Electronic Medical Record (EMR) document. The previous EMR is Dr Barahona  in [facility]. The date of service is 2/23/2024.    Thank you,  Micki Tejada   [facility] [place]

## 2024-05-30 ENCOUNTER — TELEPHONE (OUTPATIENT)
Dept: ADMINISTRATIVE | Facility: OTHER | Age: 64
End: 2024-05-30

## 2024-05-30 LAB
ANION GAP SERPL CALCULATED.3IONS-SCNC: 11 MMOL/L (ref 3–11)
BASOPHILS # BLD AUTO: 0.1 THOU/CMM (ref 0–0.1)
BASOPHILS NFR BLD AUTO: 1 %
BUN SERPL-MCNC: 11 MG/DL (ref 7–25)
CALCIUM SERPL-MCNC: 9.6 MG/DL (ref 8.5–10.1)
CHLORIDE SERPL-SCNC: 101 MMOL/L (ref 100–109)
CO2 SERPL-SCNC: 31 MMOL/L (ref 21–31)
CREAT SERPL-MCNC: 0.63 MG/DL (ref 0.4–1.1)
CYTOLOGY CMNT CVX/VAG CYTO-IMP: ABNORMAL
DIFFERENTIAL METHOD BLD: ABNORMAL
EOSINOPHIL # BLD AUTO: 0.1 THOU/CMM (ref 0–0.5)
EOSINOPHIL NFR BLD AUTO: 1 %
ERYTHROCYTE [DISTWIDTH] IN BLOOD BY AUTOMATED COUNT: 16.8 % (ref 12–16)
GFR/BSA.PRED SERPLBLD CYS-BASED-ARV: 99 ML/MIN/{1.73_M2}
GLUCOSE SERPL-MCNC: 124 MG/DL (ref 65–99)
HCT VFR BLD AUTO: 39.2 % (ref 35–43)
HGB BLD-MCNC: 13.1 G/DL (ref 11.5–14.5)
LYMPHOCYTES # BLD AUTO: 3.1 THOU/CMM (ref 1–3)
LYMPHOCYTES NFR BLD AUTO: 34 %
MCH RBC QN AUTO: 27.6 PG (ref 26–34)
MCHC RBC AUTO-ENTMCNC: 33.4 G/DL (ref 32–37)
MCV RBC AUTO: 83 FL (ref 80–100)
MONOCYTES # BLD AUTO: 0.6 THOU/CMM (ref 0.3–1)
MONOCYTES NFR BLD AUTO: 7 %
NEUTROPHILS # BLD AUTO: 5.5 THOU/CMM (ref 1.8–7.8)
NEUTROPHILS NFR BLD AUTO: 57 %
PLATELET # BLD AUTO: 285 THOU/CMM (ref 140–350)
PMV BLD REES-ECKER: 7.9 FL (ref 7.5–11.3)
POTASSIUM SERPL-SCNC: 4.2 MMOL/L (ref 3.5–5.2)
RBC # BLD AUTO: 4.74 MILL/CMM (ref 3.7–4.7)
SODIUM SERPL-SCNC: 143 MMOL/L (ref 135–145)
WBC # BLD AUTO: 9.4 THOU/CMM (ref 4–10)

## 2024-05-30 NOTE — TELEPHONE ENCOUNTER
05/30/24 2:02 PM    Patient contacted to bring Advance Directive, POLST, or Living Will document to next scheduled pcp visit.[facility] Department spoke with patient.    Thank you.  Sivan Gonzales  [facility]

## 2024-05-31 ENCOUNTER — CONSULT (OUTPATIENT)
Dept: FAMILY MEDICINE CLINIC | Facility: CLINIC | Age: 64
End: 2024-05-31
Payer: MEDICARE

## 2024-05-31 VITALS
DIASTOLIC BLOOD PRESSURE: 60 MMHG | HEART RATE: 69 BPM | TEMPERATURE: 98 F | HEIGHT: 63 IN | OXYGEN SATURATION: 97 % | SYSTOLIC BLOOD PRESSURE: 110 MMHG | WEIGHT: 256.4 LBS | BODY MASS INDEX: 45.43 KG/M2

## 2024-05-31 DIAGNOSIS — F41.9 ANXIETY: ICD-10-CM

## 2024-05-31 DIAGNOSIS — Z01.818 PRE-OP EXAMINATION: ICD-10-CM

## 2024-05-31 DIAGNOSIS — M50.10 HERNIATION OF CERVICAL INTERVERTEBRAL DISC WITH RADICULOPATHY: Primary | ICD-10-CM

## 2024-05-31 PROCEDURE — 99213 OFFICE O/P EST LOW 20 MIN: CPT | Performed by: FAMILY MEDICINE

## 2024-05-31 PROCEDURE — G2211 COMPLEX E/M VISIT ADD ON: HCPCS | Performed by: FAMILY MEDICINE

## 2024-05-31 RX ORDER — AMOXICILLIN AND CLAVULANATE POTASSIUM 500; 125 MG/1; MG/1
TABLET, FILM COATED ORAL
COMMUNITY
Start: 2024-05-29

## 2024-05-31 RX ORDER — LORAZEPAM 0.5 MG/1
TABLET ORAL
COMMUNITY
Start: 2024-04-25

## 2024-05-31 RX ORDER — POLYETHYLENE GLYCOL 3350, SODIUM SULFATE ANHYDROUS, SODIUM BICARBONATE, SODIUM CHLORIDE, POTASSIUM CHLORIDE 236; 22.74; 6.74; 5.86; 2.97 G/4L; G/4L; G/4L; G/4L; G/4L
POWDER, FOR SOLUTION ORAL
COMMUNITY
Start: 2024-04-16

## 2024-05-31 RX ORDER — CHLORHEXIDINE GLUCONATE 40 MG/ML
SOLUTION TOPICAL
COMMUNITY
Start: 2024-05-28

## 2024-05-31 RX ORDER — MOXIFLOXACIN 5 MG/ML
SOLUTION/ DROPS OPHTHALMIC
COMMUNITY
Start: 2024-05-29

## 2024-06-04 NOTE — PROGRESS NOTES
"Ambulatory Visit  Name: Hanna Hrenandez      : 1960      MRN: [id number]  Encounter Provider: Jason Clay MD  Encounter Date: 2024   Encounter department: [facility] [place]    Assessment & Plan   1. Herniation of cervical intervertebral disc with radiculopathy  2. Anxiety  3. Pre-op examination    After discussion with patient she has decided to cancel her surgery.  She says she does not feel comfortable with getting surgery at this time.  Recommend contact surgical team and cancel surgery.  Continue current treatment plan for her anxiety follow-up with psychiatry.         History of Present Illness     Patient presents to the office today for preop clearance.  She is planning to get neck surgery for herniated disc at [facility].  Surgery is planned for later this month.  She is very worried and anxious about getting surgery done.  She is going through a lot right now with her eyes and is on 2 antibiotics for her eye infection.  She is seeing an ophthalmologist for this.  Her mood is also worsening and she said she was hoping there would be a reason where she could not get surgery.  She says she is not really comfortable with getting surgery with everything that is going on.        Review of Systems   All other systems reviewed and are negative.      Objective     /60 (BP Location: Left arm, Patient Position: Sitting, Cuff Size: Standard)   Pulse 69   Temp 98 °F (36.7 °C) (Tympanic)   Ht 5' 2.5\" (1.588 m)   Wt 116 kg (256 lb 6.4 oz)   SpO2 97%   BMI 46.15 kg/m²     Physical Exam  Vitals and nursing note reviewed.   Constitutional:       General: She is not in acute distress.     Appearance: Normal appearance. She is well-developed. She is not ill-appearing, toxic-appearing or diaphoretic.   HENT:      Head: Normocephalic and atraumatic.   Eyes:      Extraocular Movements: Extraocular movements intact.      Comments: Bilateral lower eyelid swelling  " and redness    Cardiovascular:      Rate and Rhythm: Normal rate.   Pulmonary:      Effort: Pulmonary effort is normal.   Skin:     General: Skin is warm and dry.      Capillary Refill: Capillary refill takes less than 2 seconds.   Neurological:      Mental Status: She is alert and oriented to person, place, and time.   Psychiatric:         Thought Content: Thought content normal.         Judgment: Judgment normal.      Comments: Anxious        Administrative Statements

## 2024-06-11 ENCOUNTER — OFFICE VISIT (OUTPATIENT)
Dept: DIABETES SERVICES | Facility: CLINIC | Age: 64
End: 2024-06-11
Payer: MEDICARE

## 2024-06-11 VITALS — WEIGHT: 254.8 LBS | BODY MASS INDEX: 45.86 KG/M2

## 2024-06-11 DIAGNOSIS — E11.9 TYPE 2 DIABETES MELLITUS WITHOUT COMPLICATION, WITHOUT LONG-TERM CURRENT USE OF INSULIN (HCC): Primary | ICD-10-CM

## 2024-06-11 PROCEDURE — 97802 MEDICAL NUTRITION INDIV IN: CPT

## 2024-06-11 NOTE — PROGRESS NOTES
Medical Nutrition Therapy        Assessment    Visit Type: Initial visit  Chief complaint/Medical Diagnosis/reason for visit E11.9    HPI Hanna was seen in person for the initial MNT appointment. BG levels are checked 1x/day. FBG in the morning is usually in the 120s-130s. Patient does not take diabetes medication as prescribed. Instead of taking 2 Metformin tablets of 500 mg she is only taking one tablet. She did not realize her prescription says to take 2 tablets and noted this.     Hanna shared that she needs help with diet not just because of diabetes but also because of her pancreas and liver. She was teary eyed with frustration.  Diarrhea is an ongoing problem. She believes this symptom is from her Creon medication.    Patient reported no current exercise regimen. She is not ready to add a routine.    Problems identified in food recall include inconsistent carbohydrate intake, high-fat and high-sodium convenience foods, limited non-starchy vegetables and whole grains, sugary beverages, and sweets. Consumption of carbohydrates ranges from <15 to 60 grams per meal. Explained basic pathophysiology of diabetes and impact of diet on blood glucose levels and disease complications. Explained how sodium influences volume retention, blood pressure, and complications for heart disease, stroke, and CKD.     Provided phone michelle recommendations to help with tracking her carbs.     Provided patient with a 1263 calorie meal plan to assist with consistency, balance and portion control.  Encouraged the consumption of regular meals at regular times.  Advised patient to keep carbohydrate intake to 30 grams per meal and 15-20 per snack to assist with glycemic control.  Suggested keeping protein intake to 5 ounces a day and fat to 3 servings daily to assist with lipid management and calorie control. Portion booklet and food labels were used to teach basic carbohydrate counting. Patient agreed to keep daily food logs and return them  "in 2 months for assessment. RD will remain available for further dietary questions/concerns.     Ht Readings from Last 1 Encounters:   05/31/24 5' 2.5\" (1.588 m)     Wt Readings from Last 3 Encounters:   05/31/24 116 kg (256 lb 6.4 oz)   05/24/24 117 kg (258 lb 9.6 oz)   03/14/24 119 kg (262 lb 12.8 oz)     Weight Change: Yes 8 lb weight loss in 3 months    Barriers to Learning: no barriers    Do you follow any special diet presently?: No  Who shops: spouse  Who cooks: patient and spouse    Food Log: Completed via the method of food recall    Wakes up 6:30 am    Breakfast:8 am; (used to have a smoothie);  (does not skip) 4 oz bagel w/ cream cheese with hot tea (black or peach green tea) OR 2 waffles with butter and 2 TBSP maple syrup with tea  Morning Snack: chex mix- a couple handfuls or 3 out of a 6 pack of crackers or pretzels  Lunch:12 pm; chicken salad sandwich on sourdough bread and sometimes a piece fruit (a mandarin or apple) with water OR 2 mozzarella cheese or spreadable cheese and 6 ritz  Afternoon Snack: 3 pm; chex mix or pecans or crackers or pretzels  Dinner:5:30 pm; Mediterranean sandwich from [employer] OR 1/4 of cauliflower pizza OR GF pizza OR taco (6 inch; carb smart, ground beef 85/15, onion, cheese, tomato, sour cream, salsa, black olives) OR 1 c chicken and rice casserole (mixed veg- carrot, peas, corn, green beans)  Evening Snack:7:30-8 pm; 1 1/2 c ice cream OR 2 cookies or a couple glen crackers   Beverages: hot tea, water, ginger ale with salt (regular 12 oz), flavored seltzer with pink salt and maple syrup  Eating out/Take out: few times weekly; Usually a dominoes sandwich OR [address]- a special from there  Exercise none; balance therapy once weekly (tomorrow is the last day); pool therapy soon, but has an upcoming surgery on neck    Calorie needs 1263 kcals/day Carbs: 30 g/meal, 15-20 g/snack     Protein:5 ounces/day    Fat: 3 servings/day        Nutrition Diagnosis:  Food and " nutrition related knowledge deficit  related to Lack of prior exposure to accurate nutrition related information as evidenced by Verbalizes inaccurate or incomplete information    Intervention: plate method, reduced fat intake, increased fiber intake, label reading, carbohydrate counting, increased plant based foods, meal timing, meal planning, monitoring portion control, and food diary     Treatment Goals: Patient understands education and recommendations, Patient will monitor food intake daily with tracker, Patient will monitor fat intake, Patient will consume 3 meals a day, Patient will increase their intake of plant based foods, Patient will count carbohydrates, and Patient will monitor blood glucose    Monitoring and evaluation:    Term code indicator  FH 1.3.2 Food Intake Criteria: Eat 3 meals per day, 4-5 hours apart. No meal skipping. Eat your snack 2-3 hours away from your meals.  Term code indicator  FH 1.6.3 Carbohydrate Intake Criteria: Eat 30 grams of carbohydrate per meal, and no more than 15-20 grams per snack.    Materials Provided: portion book, 3-day food log    Patient’s Response to Instruction:  Comprehensiongood  Motivationgood  Expected Compliancegood    Begin Time: 10:45 am  End Time: 12:00 pm  Referring Provider: Marilu Mercado MD    Thank you for coming to the [facility] for education today.  Please feel free to call with any questions or concerns.    Avis Silver, RD  [address] [place] [phone]

## 2024-06-11 NOTE — PATIENT INSTRUCTIONS
Eat 3 meals per day, 4-5 hours apart. No meal skipping. Eat your snack 2-3 hours away from your meals.    Eat 30 grams of carbohydrate per meal, and no more than 15-20 grams per snack.    Complete 3-day food log and return completed log at the follow up appointment

## 2024-06-17 DIAGNOSIS — Z86.39 HISTORY OF HYPOKALEMIA: ICD-10-CM

## 2024-06-17 DIAGNOSIS — Z79.899 LONG TERM CURRENT USE OF DIURETIC: ICD-10-CM

## 2024-06-17 RX ORDER — POTASSIUM CHLORIDE 750 MG/1
10 TABLET, FILM COATED, EXTENDED RELEASE ORAL DAILY
Qty: 30 TABLET | Refills: 5 | Status: SHIPPED | OUTPATIENT
Start: 2024-06-17

## 2024-07-08 ENCOUNTER — TELEPHONE (OUTPATIENT)
Dept: OTHER | Facility: OTHER | Age: 64
End: 2024-07-08

## 2024-07-08 ENCOUNTER — TELEPHONE (OUTPATIENT)
Dept: NEUROLOGY | Facility: CLINIC | Age: 64
End: 2024-07-08

## 2024-07-09 NOTE — TELEPHONE ENCOUNTER
"Pt's spouse stated, \"My wife passed away on 6/23/24.\"    Patient is calling regarding cancelling an appointment.    Date/Time: 9/30/24 0940    Patient was rescheduled: YES [] NO [x]    Patient requesting call back to reschedule: YES [] NO [x]  "

## 2024-07-09 NOTE — TELEPHONE ENCOUNTER
"Pt's spouse stated, \"My wife passed away on 6/23/24.\"    Patient is calling regarding cancelling an appointment.    Date/Time: 7/9/24 1000    Patient was rescheduled: YES [] NO [x]    Patient requesting call back to reschedule: YES [] NO [x]  "
